# Patient Record
Sex: MALE | Race: WHITE | NOT HISPANIC OR LATINO | Employment: FULL TIME | URBAN - METROPOLITAN AREA
[De-identification: names, ages, dates, MRNs, and addresses within clinical notes are randomized per-mention and may not be internally consistent; named-entity substitution may affect disease eponyms.]

---

## 2017-04-03 DIAGNOSIS — I10 ESSENTIAL HYPERTENSION WITH GOAL BLOOD PRESSURE LESS THAN 140/90: ICD-10-CM

## 2017-04-03 NOTE — TELEPHONE ENCOUNTER
Losartan-Hydrochlorothiazide      Last Written Prescription Date: 11/29/16  Last Fill Quantity: 90, # refills: 1  Last Office Visit with G, P or [facility] prescribing provider: 04/18/16       Potassium   Date Value Ref Range Status   05/16/2016 3.4 3.4 - 5.3 mmol/L Final     Creatinine   Date Value Ref Range Status   05/16/2016 0.96 0.66 - 1.25 mg/dL Final     BP Readings from Last 3 Encounters:   05/16/16 124/64   05/02/16 148/78   04/18/16 160/86

## 2017-04-04 RX ORDER — LOSARTAN POTASSIUM AND HYDROCHLOROTHIAZIDE 25; 100 MG/1; MG/1
TABLET ORAL
Qty: 90 TABLET | Refills: 0 | Status: SHIPPED | OUTPATIENT
Start: 2017-04-04 | End: 2018-02-02

## 2017-04-04 NOTE — TELEPHONE ENCOUNTER
Medication is being filled for 1 time refill only due to:  Patient needs to be seen because annual.

## 2017-10-19 DIAGNOSIS — I10 ESSENTIAL HYPERTENSION WITH GOAL BLOOD PRESSURE LESS THAN 140/90: ICD-10-CM

## 2017-10-20 RX ORDER — LOSARTAN POTASSIUM AND HYDROCHLOROTHIAZIDE 25; 100 MG/1; MG/1
1 TABLET ORAL DAILY
Qty: 14 TABLET | Refills: 0 | Status: SHIPPED | OUTPATIENT
Start: 2017-10-20 | End: 2018-02-02

## 2017-10-20 NOTE — TELEPHONE ENCOUNTER
Routing refill request to provider for review/approval because:  Jen given x1 and patient did not follow up, please advise  Patient needs to be seen because it has been more than 1 year since last office visit.  Labs not current       Potassium   Date Value Ref Range Status   05/16/2016 3.4 3.4 - 5.3 mmol/L Final     Creatinine   Date Value Ref Range Status   05/16/2016 0.96 0.66 - 1.25 mg/dL Final     BP Readings from Last 3 Encounters:   05/16/16 124/64   05/02/16 148/78   04/18/16 160/86

## 2018-01-11 DIAGNOSIS — M10.09 IDIOPATHIC GOUT OF MULTIPLE SITES, UNSPECIFIED CHRONICITY: ICD-10-CM

## 2018-01-11 NOTE — TELEPHONE ENCOUNTER
"Requested Prescriptions   Pending Prescriptions Disp Refills     allopurinol (ZYLOPRIM) 100 MG tablet [Pharmacy Med Name: ALLOPURINOL 100 MG TABLET] 90 tablet 1    Last Written Prescription Date:  10-12-17  Last Fill Quantity: 90,  # refills: 1   Last Office Visit with [facility], [facility] or [facility] prescribing provider:  6-6-17   Future Office Visit:    Sig: TAKE 1 TABLET (100 MG) BY MOUTH DAILY    Gout Agents Protocol Failed    1/11/2018  1:32 AM       Failed - CBC on file in past 12 months    Recent Labs   Lab Test  04/11/16   1549   WBC  7.5   RBC  4.79   HGB  15.9   HCT  46.3   PLT  249            Failed - ALT on file in past 12 months    Recent Labs   Lab Test  05/16/16   1627   ALT  67            Failed - Uric acid greater than or equal to 6 on file in past 12 months    Recent Labs   Lab Test  04/11/16   1549   URIC  5.0     If level is 6mg/dL or greater, ok to refill one time and refer to provider.          Failed - Recent or future visit with authorizing provider's specialty    Patient had office visit in the last year or has a visit in the next 30 days with authorizing provider.  See \"Patient Info\" tab in inbasket, or \"Choose Columns\" in Meds & Orders section of the refill encounter.              Failed - Normal serum creatinine on file in the past 12 months    Recent Labs   Lab Test  05/16/16   1627   CR  0.96            Passed - Patient is age 18 or older        "

## 2018-01-23 ENCOUNTER — TELEPHONE (OUTPATIENT)
Dept: FAMILY MEDICINE | Facility: CLINIC | Age: 58
End: 2018-01-23

## 2018-01-23 DIAGNOSIS — M10.09 IDIOPATHIC GOUT OF MULTIPLE SITES, UNSPECIFIED CHRONICITY: ICD-10-CM

## 2018-01-23 NOTE — TELEPHONE ENCOUNTER
Last provider to see patient was back in 2016.  Provider was Jayla Trevino.  Will send to her.  Patient needs to be seen.  RN unable to deny.

## 2018-01-24 RX ORDER — ALLOPURINOL 100 MG/1
TABLET ORAL
Qty: 90 TABLET | Refills: 1 | OUTPATIENT
Start: 2018-01-24

## 2018-01-24 NOTE — TELEPHONE ENCOUNTER
Patient scheduled appointment with Jayla on 02/02/18, no gout present at this time. He has been out for a couple of days.

## 2018-01-25 RX ORDER — ALLOPURINOL 100 MG/1
100 TABLET ORAL DAILY
Qty: 30 TABLET | Refills: 0 | Status: SHIPPED | OUTPATIENT
Start: 2018-01-25 | End: 2018-02-02

## 2018-02-01 NOTE — PROGRESS NOTES
SUBJECTIVE:   Darnell Wick is a 57 year old male who presents to clinic today for the following health issues:      Hypertension Follow-up      Outpatient blood pressures are not being checked.    Low Salt Diet: no added salt      Amount of exercise or physical activity: 4-5 days/week for an average of 30-45 minutes    Problems taking medications regularly: No    Medication side effects: none    Diet: regular (no restrictions)      Follow up for gout. Patient states he is currently out of medication. He is unsure if he should continue with medication daily. Not having a current flare up.        Problem list and histories reviewed & adjusted, as indicated.  Additional history: as documented    Patient Active Problem List   Diagnosis     Idiopathic gout of multiple sites, unspecified chronicity     Hypertension, goal below 140/90     Past Surgical History:   Procedure Laterality Date     ORTHOPEDIC SURGERY      left middle finger       Social History   Substance Use Topics     Smoking status: Never Smoker     Smokeless tobacco: Never Used     Alcohol use Yes      Comment: Occasional, less than 1 drink per month     History reviewed. No pertinent family history.        Reviewed and updated as needed this visit by clinical staff  Tobacco  Allergies  Meds  Med Hx  Surg Hx  Fam Hx  Soc Hx      Reviewed and updated as needed this visit by Provider         ROS:  Constitutional, cardiovascular, MSK systems are negative, except as otherwise noted.    OBJECTIVE:                                                    BP (!) 197/95  Pulse 71  Wt 212 lb (96.2 kg)  SpO2 98%  BMI 33.71 kg/m2  Body mass index is 33.71 kg/(m^2).  GENERAL APPEARANCE: healthy, alert and no distress  RESP: lungs clear to auscultation - no rales, rhonchi or wheezes  CV: regular rates and rhythm, normal S1 S2, no S3 or S4, no murmur, click or rub and no irregular beats  PSYCH: mentation appears normal and affect normal/bright       ASSESSMENT:                                                       1. Hypertension, goal below 140/90    2. Idiopathic gout of multiple sites, unspecified chronicity    3. Need for hepatitis C screening test    4. Colon cancer screening    5. Lipid screening         PLAN:                                                    Will restart his anti hypertensive. Follow up blood pressure check with ancillary and repeat labs at that time. Allopurinol renewed, no changes.    Screenings discussed.    The patient was in agreement with the plan today and had no questions or concerns prior to leaving the clinic.     Jayla Trevino PA-C  [facility]

## 2018-02-02 ENCOUNTER — OFFICE VISIT (OUTPATIENT)
Dept: FAMILY MEDICINE | Facility: CLINIC | Age: 58
End: 2018-02-02
Payer: COMMERCIAL

## 2018-02-02 VITALS
WEIGHT: 212 LBS | SYSTOLIC BLOOD PRESSURE: 186 MMHG | HEART RATE: 71 BPM | OXYGEN SATURATION: 98 % | DIASTOLIC BLOOD PRESSURE: 84 MMHG | BODY MASS INDEX: 33.71 KG/M2

## 2018-02-02 DIAGNOSIS — I10 HYPERTENSION, GOAL BELOW 140/90: Primary | ICD-10-CM

## 2018-02-02 DIAGNOSIS — Z13.220 LIPID SCREENING: ICD-10-CM

## 2018-02-02 DIAGNOSIS — M10.09 IDIOPATHIC GOUT OF MULTIPLE SITES, UNSPECIFIED CHRONICITY: ICD-10-CM

## 2018-02-02 DIAGNOSIS — Z11.59 NEED FOR HEPATITIS C SCREENING TEST: ICD-10-CM

## 2018-02-02 DIAGNOSIS — Z12.11 COLON CANCER SCREENING: ICD-10-CM

## 2018-02-02 PROCEDURE — 99214 OFFICE O/P EST MOD 30 MIN: CPT | Performed by: PHYSICIAN ASSISTANT

## 2018-02-02 RX ORDER — ALLOPURINOL 100 MG/1
100 TABLET ORAL DAILY
Qty: 90 TABLET | Refills: 3 | Status: SHIPPED | OUTPATIENT
Start: 2018-02-02 | End: 2019-02-04

## 2018-02-02 RX ORDER — LOSARTAN POTASSIUM AND HYDROCHLOROTHIAZIDE 25; 100 MG/1; MG/1
1 TABLET ORAL DAILY
Qty: 90 TABLET | Refills: 0 | Status: SHIPPED | OUTPATIENT
Start: 2018-02-02 | End: 2018-05-08

## 2018-02-02 NOTE — MR AVS SNAPSHOT
"              After Visit Summary   2/2/2018    Darnell Wick    MRN: [id number]           Patient Information     Date Of Birth          1960        Visit Information        Provider Department      2/2/2018 3:40 PM Jayla Trevino PA-C [facility]        Today's Diagnoses     Hypertension, goal below 140/90    -  1    Idiopathic gout of multiple sites, unspecified chronicity        Need for hepatitis C screening test        Colon cancer screening        Lipid screening           Follow-ups after your visit        Future tests that were ordered for you today     Open Future Orders        Priority Expected Expires Ordered    Lipid panel reflex to direct LDL Non-fasting Routine  4/2/2018 2/2/2018    Hepatitis C Screen Reflex to HCV RNA Quant and Genotype Routine  4/2/2018 2/2/2018    Comprehensive metabolic panel Routine  4/2/2018 2/2/2018    Fecal colorectal cancer screen (FIT) Routine 2/23/2018 4/27/2018 2/2/2018            Who to contact     Normal or non-critical lab and imaging results will be communicated to you by LonoCloudhart, letter or phone within 4 business days after the clinic has received the results. If you do not hear from us within 7 days, please contact the clinic through LonoCloudhart or phone. If you have a critical or abnormal lab result, we will notify you by phone as soon as possible.  Submit refill requests through [employer] or call your pharmacy and they will forward the refill request to us. Please allow 3 business days for your refill to be completed.          If you need to speak with a  for additional information , please call: [phone]             Additional Information About Your Visit        Keukey Information     Keukey lets you send messages to your doctor, view your test results, renew your prescriptions, schedule appointments and more. To sign up, go to [email or web address] . Click on \"Log in\" on the left side of the screen, which will take " "you to the Welcome page. Then click on \"Sign up Now\" on the right side of the page.     You will be asked to enter the access code listed below, as well as some personal information. Please follow the directions to create your username and password.     Your access code is: [email or web address]  Expires: 5/3/2018  4:08 PM     Your access code will  in 90 days. If you need help or a new code, please call your [place] clinic or [phone].        Care EveryWhere ID     This is your Care EveryWhere ID. This could be used by other organizations to access your [place] medical records  [id number]        Your Vitals Were     Pulse Pulse Oximetry BMI (Body Mass Index)             71 98% 33.71 kg/m2          Blood Pressure from Last 3 Encounters:   18 (!) 197/95   16 124/64   16 148/78    Weight from Last 3 Encounters:   18 212 lb (96.2 kg)   16 224 lb 3.2 oz (101.7 kg)   16 222 lb (100.7 kg)                 Today's Medication Changes          These changes are accurate as of 18  4:08 PM.  If you have any questions, ask your nurse or doctor.               These medicines have changed or have updated prescriptions.        Dose/Directions    losartan-hydrochlorothiazide 100-25 MG per tablet   Commonly known as:  HYZAAR   This may have changed:    - additional instructions  - Another medication with the same name was removed. Continue taking this medication, and follow the directions you see here.   Used for:  Hypertension, goal below 140/90   Changed by:  Jayla Trevino PA-C        Dose:  1 tablet   Take 1 tablet by mouth daily - take a half tab daily for the first 1 week   Quantity:  90 tablet   Refills:  0            Where to get your medicines      These medications were sent to [facility]/pharmacy #[id number] - [place] - [address]  [address] [place] [address]     Phone:  [phone]     allopurinol 100 MG tablet    " losartan-hydrochlorothiazide 100-25 MG per tablet                Primary Care Provider Office Phone # Fax #    Jayla Trevino PA-C [phone]       [address] [facility] [address] [place] [address]        Equal Access to Services     [facility]: Hadii aad ku hadasho Soomaali, waaxda luqadaha, qaybta kaalmada adeegyada, waxay idiin hayaan adeeg kharash la'aan ah. So [facility] [phone].    ATENCIÓN: Si habla español, tiene a ponce disposición servicios gratuitos de asistencia lingüística. [facility] [phone].    We comply with applicable federal civil rights laws and [place] laws. We do not discriminate on the basis of race, color, national origin, age, disability, sex, sexual orientation, or gender identity.            Thank you!     Thank you for choosing [facility]  for your care. Our goal is always to provide you with excellent care. Hearing back from our patients is one way we can continue to improve our services. Please take a few minutes to complete the written survey that you may receive in the mail after your visit with us. Thank you!             Your Updated Medication List - Protect others around you: Learn how to safely use, store and throw away your medicines at www.disposemymeds.org.          This list is accurate as of 2/2/18  4:08 PM.  Always use your most recent med list.                   Brand Name Dispense Instructions for use Diagnosis    allopurinol 100 MG tablet    ZYLOPRIM    90 tablet    Take 1 tablet (100 mg) by mouth daily    Idiopathic gout of multiple sites, unspecified chronicity       losartan-hydrochlorothiazide 100-25 MG per tablet    HYZAAR    90 tablet    Take 1 tablet by mouth daily - take a half tab daily for the first 1 week    Hypertension, goal below 140/90

## 2018-02-09 DIAGNOSIS — Z12.11 COLON CANCER SCREENING: ICD-10-CM

## 2018-02-09 LAB — HEMOCCULT STL QL IA: NEGATIVE

## 2018-02-09 PROCEDURE — 82274 ASSAY TEST FOR BLOOD FECAL: CPT | Performed by: PHYSICIAN ASSISTANT

## 2018-02-09 NOTE — LETTER
February 12, 2018      Darnell Wick  [address] [place] [id number]        Dear ,    We are writing to inform you of your test results.    Your colon cancer screening is negative.    Resulted Orders   Fecal colorectal cancer screen (FIT)   Result Value Ref Range    Occult Blood Scn FIT Negative NEG^Negative       If you have any questions or concerns, please call the clinic at the number listed above.       Sincerely,        Jayla Trevino PA-C/farrah

## 2018-02-12 NOTE — PROGRESS NOTES
Please send the following letter to the patient:    Ray,    Your colon cancer screening is negative.    Please call me with any questions or concerns.          Jayla Trevino PA-C

## 2018-02-23 ENCOUNTER — ALLIED HEALTH/NURSE VISIT (OUTPATIENT)
Dept: NURSING | Facility: CLINIC | Age: 58
End: 2018-02-23
Payer: COMMERCIAL

## 2018-02-23 VITALS — HEART RATE: 72 BPM | DIASTOLIC BLOOD PRESSURE: 88 MMHG | SYSTOLIC BLOOD PRESSURE: 150 MMHG

## 2018-02-23 DIAGNOSIS — Z13.220 LIPID SCREENING: ICD-10-CM

## 2018-02-23 DIAGNOSIS — Z11.59 NEED FOR HEPATITIS C SCREENING TEST: ICD-10-CM

## 2018-02-23 DIAGNOSIS — I10 HYPERTENSION, GOAL BELOW 140/90: Primary | ICD-10-CM

## 2018-02-23 DIAGNOSIS — I10 HYPERTENSION, GOAL BELOW 140/90: ICD-10-CM

## 2018-02-23 LAB
ALBUMIN SERPL-MCNC: 4.1 G/DL (ref 3.4–5)
ALP SERPL-CCNC: 81 U/L (ref 40–150)
ALT SERPL W P-5'-P-CCNC: 33 U/L (ref 0–70)
ANION GAP SERPL CALCULATED.3IONS-SCNC: 9 MMOL/L (ref 3–14)
AST SERPL W P-5'-P-CCNC: 30 U/L (ref 0–45)
BILIRUB SERPL-MCNC: 1.5 MG/DL (ref 0.2–1.3)
BUN SERPL-MCNC: 18 MG/DL (ref 7–30)
CALCIUM SERPL-MCNC: 9.4 MG/DL (ref 8.5–10.1)
CHLORIDE SERPL-SCNC: 98 MMOL/L (ref 94–109)
CHOLEST SERPL-MCNC: 253 MG/DL
CO2 SERPL-SCNC: 26 MMOL/L (ref 20–32)
CREAT SERPL-MCNC: 0.93 MG/DL (ref 0.66–1.25)
CREAT UR-MCNC: 51 MG/DL
GFR SERPL CREATININE-BSD FRML MDRD: 84 ML/MIN/1.7M2
GLUCOSE SERPL-MCNC: 95 MG/DL (ref 70–99)
HDLC SERPL-MCNC: 69 MG/DL
LDLC SERPL CALC-MCNC: 162 MG/DL
MICROALBUMIN UR-MCNC: <5 MG/L
MICROALBUMIN/CREAT UR: NORMAL MG/G CR (ref 0–17)
NONHDLC SERPL-MCNC: 184 MG/DL
POTASSIUM SERPL-SCNC: 4 MMOL/L (ref 3.4–5.3)
PROT SERPL-MCNC: 8 G/DL (ref 6.8–8.8)
SODIUM SERPL-SCNC: 133 MMOL/L (ref 133–144)
TRIGL SERPL-MCNC: 112 MG/DL

## 2018-02-23 PROCEDURE — 80061 LIPID PANEL: CPT | Performed by: PHYSICIAN ASSISTANT

## 2018-02-23 PROCEDURE — 86803 HEPATITIS C AB TEST: CPT | Performed by: PHYSICIAN ASSISTANT

## 2018-02-23 PROCEDURE — 82043 UR ALBUMIN QUANTITATIVE: CPT | Performed by: PHYSICIAN ASSISTANT

## 2018-02-23 PROCEDURE — 36415 COLL VENOUS BLD VENIPUNCTURE: CPT | Performed by: PHYSICIAN ASSISTANT

## 2018-02-23 PROCEDURE — 80053 COMPREHEN METABOLIC PANEL: CPT | Performed by: PHYSICIAN ASSISTANT

## 2018-02-23 PROCEDURE — 99207 ZZC NO CHARGE NURSE ONLY: CPT

## 2018-02-23 RX ORDER — AMLODIPINE BESYLATE 5 MG/1
5 TABLET ORAL DAILY
Qty: 30 TABLET | Refills: 1 | Status: SHIPPED | OUTPATIENT
Start: 2018-02-23 | End: 2018-03-09

## 2018-02-23 NOTE — MR AVS SNAPSHOT
"              After Visit Summary   2/23/2018    Darnell Wick    MRN: [id number]           Patient Information     Date Of Birth          1960        Visit Information        Provider Department      2/23/2018 3:45 PM BE ANCILLARY [facility] [place]        Today's Diagnoses     Hypertension, goal below 140/90    -  1       Follow-ups after your visit        Your next 10 appointments already scheduled     Mar 09, 2018  3:45 PM CST   Nurse Only with BE ANCILLARY   [facility] [place] ([facility] [place])    [address] [facility]  [place] [address] [phone]              Who to contact     If you have questions or need follow up information about today's clinic visit or your schedule please contact [facility] directly at [phone].  Normal or non-critical lab and imaging results will be communicated to you by MyChart, letter or phone within 4 business days after the clinic has received the results. If you do not hear from us within 7 days, please contact the clinic through MyChart or phone. If you have a critical or abnormal lab result, we will notify you by phone as soon as possible.  Submit refill requests through [employer] or call your pharmacy and they will forward the refill request to us. Please allow 3 business days for your refill to be completed.          Additional Information About Your Visit        [employer] Information     [employer] lets you send messages to your doctor, view your test results, renew your prescriptions, schedule appointments and more. To sign up, go to [email or web address] . Click on \"Log in\" on the left side of the screen, which will take you to the Welcome page. Then click on \"Sign up Now\" on the right side of the page.     You will be asked to enter the access code listed below, as well as some personal information. Please follow the directions to create your username and password.     Your access code is: [email or web address]  Expires: 5/3/2018  " 4:08 PM     Your access code will  in 90 days. If you need help or a new code, please call your [place] clinic or [phone].        Care EveryWhere ID     This is your Care EveryWhere ID. This could be used by other organizations to access your [place] medical records  [id number]        Your Vitals Were     Pulse                   72            Blood Pressure from Last 3 Encounters:   18 150/88   18 186/84   16 124/64    Weight from Last 3 Encounters:   18 212 lb (96.2 kg)   16 224 lb 3.2 oz (101.7 kg)   16 222 lb (100.7 kg)              Today, you had the following     No orders found for display         Today's Medication Changes          These changes are accurate as of 18  3:59 PM.  If you have any questions, ask your nurse or doctor.               Start taking these medicines.        Dose/Directions    amLODIPine 5 MG tablet   Commonly known as:  NORVASC   Used for:  Hypertension, goal below 140/90        Dose:  5 mg   Take 1 tablet (5 mg) by mouth daily   Quantity:  30 tablet   Refills:  1            Where to get your medicines      These medications were sent to [facility]/pharmacy #[id number] - [place], [place] - [address]  [address] , [facility] [place] [address]     Phone:  [phone]     amLODIPine 5 MG tablet                Primary Care Provider Office Phone # Fax #    Jayla Trevino PA-C [phone]       [address] [place] [address]        Equal Access to Services     [facility] : Hadii aad ku hadasho Soomaali, waaxda luqadaha, qaybta kaalmada johana, cooper loyola . So [facility] [phone].    ATENCIÓN: Si habla español, tiene a ponce disposición servicios gratuitos de asistencia lingüística. [place] al [phone].    We comply with applicable federal civil rights laws and [place] laws. We do not discriminate on the basis of race, color, national origin, age, disability, sex,  sexual orientation, or gender identity.            Thank you!     Thank you for choosing [facility]  for your care. Our goal is always to provide you with excellent care. Hearing back from our patients is one way we can continue to improve our services. Please take a few minutes to complete the written survey that you may receive in the mail after your visit with us. Thank you!             Your Updated Medication List - Protect others around you: Learn how to safely use, store and throw away your medicines at www.disposemymeds.org.          This list is accurate as of 2/23/18  3:59 PM.  Always use your most recent med list.                   Brand Name Dispense Instructions for use Diagnosis    allopurinol 100 MG tablet    ZYLOPRIM    90 tablet    Take 1 tablet (100 mg) by mouth daily    Idiopathic gout of multiple sites, unspecified chronicity       amLODIPine 5 MG tablet    NORVASC    30 tablet    Take 1 tablet (5 mg) by mouth daily    Hypertension, goal below 140/90       losartan-hydrochlorothiazide 100-25 MG per tablet    HYZAAR    90 tablet    Take 1 tablet by mouth daily - take a half tab daily for the first 1 week    Hypertension, goal below 140/90

## 2018-02-23 NOTE — PROGRESS NOTES
Darnell Wick is a 57 year old male who comes in today for a Blood Pressure check because of ongoing blood pressure monitoring.    *Document pulse and BP  *Use new set of vitals button for multiple readings.  *Use extended vitals for orthostatic    Vitals as recorded, a large cuff was used.    Patient is taking medication as prescribed  Patient is tolerating medications well.  Patient is not monitoring Blood Pressure at home.      Current complaints: none    Disposition: results routed to MD/MONY

## 2018-02-24 LAB — HCV AB SERPL QL IA: NONREACTIVE

## 2018-02-26 ENCOUNTER — TELEPHONE (OUTPATIENT)
Dept: FAMILY MEDICINE | Facility: CLINIC | Age: 58
End: 2018-02-26

## 2018-02-26 DIAGNOSIS — E78.5 HYPERLIPIDEMIA LDL GOAL <130: Primary | ICD-10-CM

## 2018-02-26 NOTE — LETTER
[facility]  [address] [facility] [address]  [phone]        October 4, 2018    Darnell Wick  [address] [place] [id number]              Dear Darnell Wick    This is to remind you that your Lipid profile is due.    You may call our office at [phone] to schedule an appointment.    Please disregard this notice if you have already had your labs drawn or made an appointment.        Sincerely,        Jayla Trevino PA-C

## 2018-02-26 NOTE — LETTER
Dr. Duyen Gale, pathologist in room March 1, 2018      Darnell Wick  [address] [place] [id number]        Dear ,    We are writing to inform you of your test results.    Kidney function is normal. Your blood sugar is normal - no signs of diabetes. Hep C screen is negative.  LDL cholesterol is high with your goal being <130. I'd like to recheck this in 6 months as high cholesterol can increase risk of plaque build up, heart attacks and strokes    Resulted Orders   Lipid panel reflex to direct LDL Non-fasting   Result Value Ref Range    Cholesterol 253 (H) <200 mg/dL      Comment:      Desirable:       <200 mg/dl    Triglycerides 112 <150 mg/dL    HDL Cholesterol 69 >39 mg/dL    LDL Cholesterol Calculated 162 (H) <100 mg/dL      Comment:      Above desirable:  100-129 mg/dl  Borderline High:  130-159 mg/dL  High:             160-189 mg/dL  Very high:       >189 mg/dl      Non HDL Cholesterol 184 (H) <130 mg/dL      Comment:      Above Desirable:  130-159 mg/dl  Borderline high:  160-189 mg/dl  High:             190-219 mg/dl  Very high:       >219 mg/dl     Hepatitis C Screen Reflex to HCV RNA Quant and Genotype   Result Value Ref Range    Hepatitis C Antibody Nonreactive NR^Nonreactive      Comment:      Assay performance characteristics have not been established for newborns,   infants, and children     Comprehensive metabolic panel   Result Value Ref Range    Sodium 133 133 - 144 mmol/L    Potassium 4.0 3.4 - 5.3 mmol/L    Chloride 98 94 - 109 mmol/L    Carbon Dioxide 26 20 - 32 mmol/L    Anion Gap 9 3 - 14 mmol/L    Glucose 95 70 - 99 mg/dL    Urea Nitrogen 18 7 - 30 mg/dL    Creatinine 0.93 0.66 - 1.25 mg/dL    GFR Estimate 84 >60 mL/min/1.7m2      Comment:      Non  GFR Calc    GFR Estimate If Black >90 >60 mL/min/1.7m2      Comment:       GFR Calc    Calcium 9.4 8.5 - 10.1 mg/dL    Bilirubin Total 1.5 (H) 0.2 - 1.3 mg/dL    Albumin 4.1 3.4 - 5.0 g/dL    Protein Total 8.0 6.8 - 8.8 g/dL     Alkaline Phosphatase 81 40 - 150 U/L    ALT 33 0 - 70 U/L    AST 30 0 - 45 U/L   Albumin Random Urine Quantitative with Creat Ratio   Result Value Ref Range    Creatinine Urine 51 mg/dL    Albumin Urine mg/L <5 mg/L    Albumin Urine mg/g Cr Unable to calculate due to low value 0 - 17 mg/g Cr       If you have any questions or concerns, please call the clinic at the number listed above.       Sincerely,        Jayla Trevino PA-C/rogerso

## 2018-02-26 NOTE — TELEPHONE ENCOUNTER
Please call patient with the following info:    Kidney function is normal. Your blood sugar is normal - no signs of diabetes. Hep C screen is negative.  LDL cholesterol is high with his goal being <130. I'd like to recheck this in 6 months as high cholesterol can increase risk of plaque build up, heart attacks and strokes

## 2018-02-26 NOTE — LETTER
[facility]  [address] [facility] [address]  [phone]        December 18, 2018    Darnell Wick  [address] [place] [id number]              Dear Darnell Wick    This is to remind you that your Lipid profile is due.    You may call our office at [phone] to schedule an appointment.    Please disregard this notice if you have already had your labs drawn or made an appointment.        Sincerely,        Jayla Trevino PA-C

## 2018-03-01 NOTE — TELEPHONE ENCOUNTER
Patient returned call, informed him of results and recommendations. Patient understood. No further questions. Letter and result mailed to pt. DAVE Briseno

## 2018-03-01 NOTE — TELEPHONE ENCOUNTER
Patient returned call, voicemail left stating he does not get home until after 415 pm. Request to be called then

## 2018-03-09 ENCOUNTER — ALLIED HEALTH/NURSE VISIT (OUTPATIENT)
Dept: NURSING | Facility: CLINIC | Age: 58
End: 2018-03-09
Payer: COMMERCIAL

## 2018-03-09 VITALS — DIASTOLIC BLOOD PRESSURE: 80 MMHG | HEART RATE: 72 BPM | SYSTOLIC BLOOD PRESSURE: 140 MMHG

## 2018-03-09 DIAGNOSIS — I10 HYPERTENSION, GOAL BELOW 140/90: ICD-10-CM

## 2018-03-09 PROCEDURE — 99207 ZZC NO CHARGE NURSE ONLY: CPT

## 2018-03-09 RX ORDER — AMLODIPINE BESYLATE 10 MG/1
10 TABLET ORAL DAILY
Qty: 30 TABLET | Refills: 1 | Status: SHIPPED | OUTPATIENT
Start: 2018-03-09 | End: 2018-04-02

## 2018-03-09 NOTE — MR AVS SNAPSHOT
"              After Visit Summary   3/9/2018    Darnell Wick    MRN: [id number]           Patient Information     Date Of Birth          1960        Visit Information        Provider Department      3/9/2018 3:45 PM BE ANCILLARY [facility]        Today's Diagnoses     Hypertension, goal below 140/90           Follow-ups after your visit        Who to contact     If you have questions or need follow up information about today's clinic visit or your schedule please contact [facility] directly at [phone].  Normal or non-critical lab and imaging results will be communicated to you by MyChart, letter or phone within 4 business days after the clinic has received the results. If you do not hear from us within 7 days, please contact the clinic through TalkTohart or phone. If you have a critical or abnormal lab result, we will notify you by phone as soon as possible.  Submit refill requests through [employer] or call your pharmacy and they will forward the refill request to us. Please allow 3 business days for your refill to be completed.          Additional Information About Your Visit        MyChart Information     [employer] lets you send messages to your doctor, view your test results, renew your prescriptions, schedule appointments and more. To sign up, go to [email or web address] . Click on \"Log in\" on the left side of the screen, which will take you to the Welcome page. Then click on \"Sign up Now\" on the right side of the page.     You will be asked to enter the access code listed below, as well as some personal information. Please follow the directions to create your username and password.     Your access code is: [email or web address]  Expires: 5/3/2018  4:08 PM     Your access code will  in 90 days. If you need help or a new code, please call your [place] clinic or [phone].        Care EveryWhere ID     This is your Care EveryWhere ID. This could be used by other organizations to " access your [place] medical records  [id number]        Your Vitals Were     Pulse                   72            Blood Pressure from Last 3 Encounters:   03/09/18 140/80   02/23/18 150/88   02/02/18 186/84    Weight from Last 3 Encounters:   02/02/18 212 lb (96.2 kg)   04/18/16 224 lb 3.2 oz (101.7 kg)   04/11/16 222 lb (100.7 kg)              Today, you had the following     No orders found for display         Today's Medication Changes          These changes are accurate as of 3/9/18  3:59 PM.  If you have any questions, ask your nurse or doctor.               These medicines have changed or have updated prescriptions.        Dose/Directions    amLODIPine 10 MG tablet   Commonly known as:  NORVASC   This may have changed:    - medication strength  - how much to take   Used for:  Hypertension, goal below 140/90        Dose:  10 mg   Take 1 tablet (10 mg) by mouth daily   Quantity:  30 tablet   Refills:  1            Where to get your medicines      These medications were sent to [facility]/pharmacy [facility][id number] [place] [place] [address] [address]  [address] [address] , [facility] [address]     Phone:  [phone]     amLODIPine 10 MG tablet                Primary Care Provider Office Phone # Fax #    Jayla Trevino PA-C [phone] [address] [place] [address]        Equal Access to Services     [facility]: Hadii aad ku hadasho Soomaali, waaxda luqadaha, qaybta kaalmada adeegyada, cooper loyola . So [facility] [phone].    ATENCIÓN: Si habla español, tiene a ponce disposición servicios gratuitos de asistencia lingüística. Llame al [phone].    We comply with applicable federal civil rights laws and [place] laws. We do not discriminate on the basis of race, color, national origin, age, disability, sex, sexual orientation, or gender identity.            Thank you!     Thank you for choosing [facility]  for your care. Our goal is  always to provide you with excellent care. Hearing back from our patients is one way we can continue to improve our services. Please take a few minutes to complete the written survey that you may receive in the mail after your visit with us. Thank you!             Your Updated Medication List - Protect others around you: Learn how to safely use, store and throw away your medicines at www.disposemymeds.org.          This list is accurate as of 3/9/18  3:59 PM.  Always use your most recent med list.                   Brand Name Dispense Instructions for use Diagnosis    allopurinol 100 MG tablet    ZYLOPRIM    90 tablet    Take 1 tablet (100 mg) by mouth daily    Idiopathic gout of multiple sites, unspecified chronicity       amLODIPine 10 MG tablet    NORVASC    30 tablet    Take 1 tablet (10 mg) by mouth daily    Hypertension, goal below 140/90       losartan-hydrochlorothiazide 100-25 MG per tablet    HYZAAR    90 tablet    Take 1 tablet by mouth daily - take a half tab daily for the first 1 week    Hypertension, goal below 140/90

## 2018-03-09 NOTE — PROGRESS NOTES
Darnell Wick is a 57 year old male who comes in today for a Blood Pressure check because of new medication.    *Document pulse and BP  *Use new set of vitals button for multiple readings.  *Use extended vitals for orthostatic    Vitals as recorded, a large cuff was used.    Patient is taking medication as prescribed  Patient is tolerating medications well.  Patient is not monitoring Blood Pressure at home.      Current complaints: none    Disposition: MD/AP notified while patient in the clinic  Nancy Pena CMA

## 2018-03-30 ENCOUNTER — ALLIED HEALTH/NURSE VISIT (OUTPATIENT)
Dept: NURSING | Facility: CLINIC | Age: 58
End: 2018-03-30
Payer: COMMERCIAL

## 2018-03-30 VITALS
SYSTOLIC BLOOD PRESSURE: 139 MMHG | WEIGHT: 210 LBS | BODY MASS INDEX: 33.39 KG/M2 | RESPIRATION RATE: 16 BRPM | HEART RATE: 73 BPM | OXYGEN SATURATION: 99 % | DIASTOLIC BLOOD PRESSURE: 78 MMHG

## 2018-03-30 DIAGNOSIS — I10 HYPERTENSION, GOAL BELOW 140/90: Primary | ICD-10-CM

## 2018-03-30 PROCEDURE — 99207 ZZC NO CHARGE NURSE ONLY: CPT

## 2018-03-30 NOTE — NURSING NOTE
Darnell Wick is a 58 year old male who comes in today for a Blood Pressure check because of new medication dose increase and ongoing blood pressure monitoring.        Vitals as recorded, a large cuff was used.    Patient is taking medication as prescribed  Patient is tolerating medications well.  Patient is not monitoring Blood Pressure at home.      Current complaints: none    Disposition: results routed to MD/PA

## 2018-03-30 NOTE — MR AVS SNAPSHOT
"              After Visit Summary   3/30/2018    Darnell Wick    MRN: [id number]           Patient Information     Date Of Birth          1960        Visit Information        Provider Department      3/30/2018 3:45 PM BE ANCILLARY [facility]        Today's Diagnoses     Hypertension, goal below 140/90    -  1       Follow-ups after your visit        Who to contact     If you have questions or need follow up information about today's clinic visit or your schedule please contact [facility] directly at [phone].  Normal or non-critical lab and imaging results will be communicated to you by MyChart, letter or phone within 4 business days after the clinic has received the results. If you do not hear from us within 7 days, please contact the clinic through Zoe Majest[employer]art or phone. If you have a critical or abnormal lab result, we will notify you by phone as soon as possible.  Submit refill requests through [employer] or call your pharmacy and they will forward the refill request to us. Please allow 3 business days for your refill to be completed.          Additional Information About Your Visit        MyChart Information     [employer] lets you send messages to your doctor, view your test results, renew your prescriptions, schedule appointments and more. To sign up, go to [email or web address] . Click on \"Log in\" on the left side of the screen, which will take you to the Welcome page. Then click on \"Sign up Now\" on the right side of the page.     You will be asked to enter the access code listed below, as well as some personal information. Please follow the directions to create your username and password.     Your access code is: [email or web address]  Expires: 5/3/2018  5:08 PM     Your access code will  in 90 days. If you need help or a new code, please call your [facility] or [phone].        Care EveryWhere ID     This is your Care EveryWhere ID. This could be used by other " organizations to access your [place] medical records  [id number]        Your Vitals Were     Pulse Respirations Pulse Oximetry BMI (Body Mass Index)          73 16 99% 33.39 kg/m2         Blood Pressure from Last 3 Encounters:   03/30/18 139/78   03/09/18 140/80   02/23/18 150/88    Weight from Last 3 Encounters:   03/30/18 210 lb (95.3 kg)   02/02/18 212 lb (96.2 kg)   04/18/16 224 lb 3.2 oz (101.7 kg)              Today, you had the following     No orders found for display       Primary Care Provider Office Phone # Fax #    Jayla Trevino PA-C [phone]       [address] [place] [address]        Equal Access to Services     [facility]: Hadii aad ku hadasho Soomaali, waaxda luqadaha, qaybta kaalmada adeegyada, waxay allyssain hayalberto loyola . So [facility] [phone].    ATENCIÓN: Si habla español, tiene a ponce disposición servicios gratuitos de asistencia lingüística. Cate al [phone].    We comply with applicable federal civil rights laws and [place] laws. We do not discriminate on the basis of race, color, national origin, age, disability, sex, sexual orientation, or gender identity.            Thank you!     Thank you for choosing [facility]  for your care. Our goal is always to provide you with excellent care. Hearing back from our patients is one way we can continue to improve our services. Please take a few minutes to complete the written survey that you may receive in the mail after your visit with us. Thank you!             Your Updated Medication List - Protect others around you: Learn how to safely use, store and throw away your medicines at www.disposemymeds.org.          This list is accurate as of 3/30/18  4:06 PM.  Always use your most recent med list.                   Brand Name Dispense Instructions for use Diagnosis    allopurinol 100 MG tablet    ZYLOPRIM    90 tablet    Take 1 tablet (100 mg) by mouth daily    Idiopathic gout of  multiple sites, unspecified chronicity       amLODIPine 10 MG tablet    NORVASC    30 tablet    Take 1 tablet (10 mg) by mouth daily    Hypertension, goal below 140/90       losartan-hydrochlorothiazide 100-25 MG per tablet    HYZAAR    90 tablet    Take 1 tablet by mouth daily - take a half tab daily for the first 1 week    Hypertension, goal below 140/90

## 2018-04-02 RX ORDER — AMLODIPINE BESYLATE 10 MG/1
10 TABLET ORAL DAILY
Qty: 90 TABLET | Refills: 3 | Status: SHIPPED | OUTPATIENT
Start: 2018-04-02 | End: 2019-02-15

## 2018-05-08 DIAGNOSIS — I10 HYPERTENSION, GOAL BELOW 140/90: ICD-10-CM

## 2018-05-08 NOTE — TELEPHONE ENCOUNTER
"Requested Prescriptions   Pending Prescriptions Disp Refills     losartan-hydrochlorothiazide (HYZAAR) 100-25 MG per tablet [Pharmacy Med Name: LOSARTAN-HCTZ 100-25 MG TAB] 90 tablet 0    Last Written Prescription Date:  2/2/18  Last Fill Quantity: 90,  # refills: 0   Last office visit: 2/2/2018 with prescribing provider:  2/2/18 Knaeble   Future Office Visit:   Sig: TAKE 1 TABLET BY MOUTH DAILY - TAKE A HALF TAB DAILY FOR THE FIRST 1 WEEK    Angiotensin-II Receptors Passed    5/8/2018  4:23 PM       Passed - Blood pressure under 140/90 in past 12 months    BP Readings from Last 3 Encounters:   03/30/18 139/78   03/09/18 140/80   02/23/18 150/88                Passed - Recent (12 mo) or future (30 days) visit within the authorizing provider's specialty    Patient had office visit in the last 12 months or has a visit in the next 30 days with authorizing provider or within the authorizing provider's specialty.  See \"Patient Info\" tab in inbasket, or \"Choose Columns\" in Meds & Orders section of the refill encounter.           Passed - Patient is age 18 or older       Passed - Normal serum creatinine on file in past 12 months    Recent Labs   Lab Test  02/23/18   1601   CR  0.93            Passed - Normal serum potassium on file in past 12 months    Recent Labs   Lab Test  02/23/18   1601   POTASSIUM  4.0                    "

## 2018-05-09 RX ORDER — LOSARTAN POTASSIUM AND HYDROCHLOROTHIAZIDE 25; 100 MG/1; MG/1
1 TABLET ORAL DAILY
Qty: 90 TABLET | Refills: 1 | Status: SHIPPED | OUTPATIENT
Start: 2018-05-09 | End: 2018-11-03

## 2018-05-09 NOTE — TELEPHONE ENCOUNTER
Routing refill request to provider for review/approval because:  Sig updated to 1 tab daily, previous sig stated-      Disp Refills Start End SARANYA     losartan-hydrochlorothiazide (HYZAAR) 100-25 MG per tablet 90 tablet 0 2/2/2018  No     Sig: Take 1 tablet by mouth daily - take a half tab daily for the first 1 week

## 2018-11-03 DIAGNOSIS — I10 HYPERTENSION, GOAL BELOW 140/90: ICD-10-CM

## 2018-11-05 RX ORDER — LOSARTAN POTASSIUM AND HYDROCHLOROTHIAZIDE 25; 100 MG/1; MG/1
TABLET ORAL
Qty: 90 TABLET | Refills: 1 | Status: SHIPPED | OUTPATIENT
Start: 2018-11-05 | End: 2019-02-15

## 2018-11-05 NOTE — TELEPHONE ENCOUNTER
"Requested Prescriptions   Pending Prescriptions Disp Refills     losartan-hydrochlorothiazide (HYZAAR) 100-25 MG per tablet [Pharmacy Med Name: LOSARTAN-HCTZ 100-25 MG TAB] 90 tablet 1    Last Written Prescription Date:  08/04/18  Last Fill Quantity: 90,  # refills: 1   Last office visit: 2/2/2018 with prescribing provider:  OTONIEL Trevino Future Office Visit:     Sig: TAKE 1 TABLET BY MOUTH EVERY DAY    Angiotensin-II Receptors Passed    11/3/2018  1:32 AM       Passed - Blood pressure under 140/90 in past 12 months    BP Readings from Last 3 Encounters:   03/30/18 139/78   03/09/18 140/80   02/23/18 150/88                Passed - Recent (12 mo) or future (30 days) visit within the authorizing provider's specialty    Patient had office visit in the last 12 months or has a visit in the next 30 days with authorizing provider or within the authorizing provider's specialty.  See \"Patient Info\" tab in inbasket, or \"Choose Columns\" in Meds & Orders section of the refill encounter.             Passed - Patient is age 18 or older       Passed - Normal serum creatinine on file in past 12 months    Recent Labs   Lab Test  02/23/18   1601   CR  0.93            Passed - Normal serum potassium on file in past 12 months    Recent Labs   Lab Test  02/23/18   1601   POTASSIUM  4.0                      "

## 2019-02-15 ENCOUNTER — OFFICE VISIT (OUTPATIENT)
Dept: FAMILY MEDICINE | Facility: CLINIC | Age: 59
End: 2019-02-15
Payer: COMMERCIAL

## 2019-02-15 VITALS — DIASTOLIC BLOOD PRESSURE: 68 MMHG | SYSTOLIC BLOOD PRESSURE: 132 MMHG

## 2019-02-15 DIAGNOSIS — Z12.11 COLON CANCER SCREENING: ICD-10-CM

## 2019-02-15 DIAGNOSIS — E78.5 HYPERLIPIDEMIA LDL GOAL <130: Primary | ICD-10-CM

## 2019-02-15 DIAGNOSIS — M10.09 IDIOPATHIC GOUT OF MULTIPLE SITES, UNSPECIFIED CHRONICITY: ICD-10-CM

## 2019-02-15 DIAGNOSIS — Z12.5 SCREENING PSA (PROSTATE SPECIFIC ANTIGEN): ICD-10-CM

## 2019-02-15 DIAGNOSIS — I10 HYPERTENSION, GOAL BELOW 140/90: ICD-10-CM

## 2019-02-15 LAB
ALBUMIN SERPL-MCNC: 4.2 G/DL (ref 3.4–5)
ALP SERPL-CCNC: 81 U/L (ref 40–150)
ALT SERPL W P-5'-P-CCNC: 46 U/L (ref 0–70)
ANION GAP SERPL CALCULATED.3IONS-SCNC: 7 MMOL/L (ref 3–14)
AST SERPL W P-5'-P-CCNC: 35 U/L (ref 0–45)
BILIRUB SERPL-MCNC: 1.2 MG/DL (ref 0.2–1.3)
BUN SERPL-MCNC: 15 MG/DL (ref 7–30)
CALCIUM SERPL-MCNC: 9.4 MG/DL (ref 8.5–10.1)
CHLORIDE SERPL-SCNC: 98 MMOL/L (ref 94–109)
CHOLEST SERPL-MCNC: 206 MG/DL
CO2 SERPL-SCNC: 27 MMOL/L (ref 20–32)
CREAT SERPL-MCNC: 1.01 MG/DL (ref 0.66–1.25)
CREAT UR-MCNC: 84 MG/DL
GFR SERPL CREATININE-BSD FRML MDRD: 82 ML/MIN/{1.73_M2}
GLUCOSE SERPL-MCNC: 108 MG/DL (ref 70–99)
HDLC SERPL-MCNC: 56 MG/DL
LDLC SERPL CALC-MCNC: 128 MG/DL
MICROALBUMIN UR-MCNC: 6 MG/L
MICROALBUMIN/CREAT UR: 6.59 MG/G CR (ref 0–17)
NONHDLC SERPL-MCNC: 150 MG/DL
POTASSIUM SERPL-SCNC: 3.6 MMOL/L (ref 3.4–5.3)
PROT SERPL-MCNC: 8.3 G/DL (ref 6.8–8.8)
PSA SERPL-ACNC: 6.47 UG/L (ref 0–4)
SODIUM SERPL-SCNC: 132 MMOL/L (ref 133–144)
TRIGL SERPL-MCNC: 111 MG/DL

## 2019-02-15 PROCEDURE — 36415 COLL VENOUS BLD VENIPUNCTURE: CPT | Performed by: PHYSICIAN ASSISTANT

## 2019-02-15 PROCEDURE — 80053 COMPREHEN METABOLIC PANEL: CPT | Performed by: PHYSICIAN ASSISTANT

## 2019-02-15 PROCEDURE — G0103 PSA SCREENING: HCPCS | Performed by: PHYSICIAN ASSISTANT

## 2019-02-15 PROCEDURE — 82043 UR ALBUMIN QUANTITATIVE: CPT | Performed by: PHYSICIAN ASSISTANT

## 2019-02-15 PROCEDURE — 80061 LIPID PANEL: CPT | Performed by: PHYSICIAN ASSISTANT

## 2019-02-15 PROCEDURE — 99214 OFFICE O/P EST MOD 30 MIN: CPT | Performed by: PHYSICIAN ASSISTANT

## 2019-02-15 RX ORDER — AMLODIPINE BESYLATE 10 MG/1
10 TABLET ORAL DAILY
Qty: 90 TABLET | Refills: 3 | Status: SHIPPED | OUTPATIENT
Start: 2019-02-15 | End: 2020-02-26

## 2019-02-15 RX ORDER — ALLOPURINOL 100 MG/1
100 TABLET ORAL DAILY
Qty: 90 TABLET | Refills: 3 | Status: SHIPPED | OUTPATIENT
Start: 2019-02-15 | End: 2020-03-10

## 2019-02-15 RX ORDER — LOSARTAN POTASSIUM AND HYDROCHLOROTHIAZIDE 25; 100 MG/1; MG/1
1 TABLET ORAL DAILY
Qty: 90 TABLET | Refills: 3 | Status: SHIPPED | OUTPATIENT
Start: 2019-02-15 | End: 2020-05-05

## 2019-02-15 NOTE — PROGRESS NOTES
SUBJECTIVE:   Darnell Wick is a 58 year old male who presents to clinic today for the following health issues:    Gout  Taking allopurinol  No flares    Hyperlipidemia Follow-Up      Rate your low fat/cholesterol diet?: good    Taking statin?  No    Other lipid medications/supplements?:  Fish oil/Omega 3, without side effects    Hypertension Follow-up      Outpatient blood pressures are not being checked.    Low Salt Diet: no added salt       Amount of exercise or physical activity: None outside of work    Problems taking medications regularly: No    Medication side effects: none    Diet: regular (no restrictions)    **He is also wanting to talk about vitamin D        Problem list and histories reviewed & adjusted, as indicated.  Additional history: as documented    Patient Active Problem List   Diagnosis     Idiopathic gout of multiple sites, unspecified chronicity     Hypertension, goal below 140/90     Hyperlipidemia LDL goal <130     Past Surgical History:   Procedure Laterality Date     ORTHOPEDIC SURGERY      left middle finger       Social History     Tobacco Use     Smoking status: Never Smoker     Smokeless tobacco: Never Used   Substance Use Topics     Alcohol use: Yes     Comment: Occasional, less than 1 drink per month     No family history on file.        Reviewed and updated as needed this visit by clinical staff       Reviewed and updated as needed this visit by Provider         ROS:  Constitutional, cardiovascular systems are negative, except as otherwise noted.    OBJECTIVE:                                                    There were no vitals taken for this visit.  There is no height or weight on file to calculate BMI.  GENERAL APPEARANCE: healthy, alert and no distress  RESP: lungs clear to auscultation - no rales, rhonchi or wheezes  CV: regular rates and rhythm, normal S1 S2, no S3 or S4, no murmur, click or rub, no irregular beats and no bruits heard  MS: extremities normal- no gross  deformities noted  PSYCH: mentation appears normal and affect normal/bright       ASSESSMENT:                                                      1. Hyperlipidemia LDL goal <130    2. Hypertension, goal below 140/90    3. Idiopathic gout of multiple sites, unspecified chronicity    4. Screening PSA (prostate specific antigen)    5. Colon cancer screening         PLAN:                                                    1,3) Routine labs today. Meds renewed, no changes. These conditions are stable. Follow up annually.    4,5) Screenings discussed.     Patient Instructions   You should be taking 2,000 units of vitamin D per day - at least 6-9 months of the year.       The patient was in agreement with the plan today and had no questions or concerns prior to leaving the clinic.     Jayla Trevino PA-C  [facility]

## 2019-02-20 ENCOUNTER — TELEPHONE (OUTPATIENT)
Dept: FAMILY MEDICINE | Facility: CLINIC | Age: 59
End: 2019-02-20

## 2019-02-20 DIAGNOSIS — R97.20 ELEVATED PROSTATE SPECIFIC ANTIGEN (PSA): Primary | ICD-10-CM

## 2019-02-20 NOTE — TELEPHONE ENCOUNTER
Please call patient with the following info:    Kidney function, electrolytes, liver enzymes look fine.  Cholesterol is at goal.    Your blood sugar is elevated and in the prediabetes range. Cutting back on carbs/starches is important to prevent the development of diabetes. Would you be interested in attending one of our prediabetes classes or seeing our nutritionist?     PSA is elevated - the prostate cancer screening we discussed. I'd like him to follow up with urology for further evaluation. Referral placed

## 2019-02-20 NOTE — TELEPHONE ENCOUNTER
Spoke with patient, given information from Jayla.    Patient verbalized understanding and agrees with plan.     Patient declining pre-diabetes class or nutritionist.    Given phone number to [facility]. [phone].    Nancy Pool, RN, BSN

## 2019-02-20 NOTE — TELEPHONE ENCOUNTER
Called and spoke with wife, patient still at work until 3:30 pm.    Wants RN to call back later.    Nancy Pool, RN, BSN

## 2019-02-20 NOTE — TELEPHONE ENCOUNTER
Called again at 4pm, patient had returned home from work and went to chiropractor.    Advised wife to have Ray call the clinic when he gets home.     Wife verbalized understanding and agrees with plan.    Nancy Pool, RN, BSN

## 2019-02-22 DIAGNOSIS — Z12.11 COLON CANCER SCREENING: ICD-10-CM

## 2019-02-22 LAB — HEMOCCULT STL QL IA: NEGATIVE

## 2019-02-22 PROCEDURE — 82274 ASSAY TEST FOR BLOOD FECAL: CPT | Performed by: PHYSICIAN ASSISTANT

## 2019-02-22 NOTE — LETTER
February 25, 2019      Darnell Wick  [address] [place] [phone]        Dear ,    We are writing to inform you of your test results.     Normal stool test.   Resulted Orders   Fecal colorectal cancer screen (FIT)   Result Value Ref Range    Occult Blood Scn FIT Negative NEG^Negative       If you have any questions or concerns, please call the clinic at the number listed above.       Sincerely,        Indira PAZ/farrah

## 2019-02-22 NOTE — RESULT ENCOUNTER NOTE
Please send letter, normal stool test.     Follow up as needed.       Sincerely,    JEAN PIERRE Muro, FNP-BC

## 2020-02-25 DIAGNOSIS — I10 HYPERTENSION, GOAL BELOW 140/90: ICD-10-CM

## 2020-02-25 NOTE — TELEPHONE ENCOUNTER
"Requested Prescriptions   Pending Prescriptions Disp Refills     amLODIPine (NORVASC) 10 MG tablet [Pharmacy Med Name: AMLODIPINE BESYLATE 10 MG TAB] 90 tablet 3     Sig: TAKE 1 TABLET BY MOUTH EVERY DAY  Last Written Prescription Date:  11/26/19  Last Fill Quantity: 90,  # refills: 3   Last office visit: 2/15/2019 with prescribing provider:  Belinda   Future Office Visit:         Calcium Channel Blockers Protocol  Failed - 2/25/2020  3:53 PM        Failed - Blood pressure under 140/90 in past 12 months     BP Readings from Last 3 Encounters:   02/15/19 132/68   03/30/18 139/78   03/09/18 140/80                 Failed - Recent (12 mo) or future (30 days) visit within the authorizing provider's specialty     Patient has had an office visit with the authorizing provider or a provider within the authorizing providers department within the previous 12 mos or has a future within next 30 days. See \"Patient Info\" tab in inbasket, or \"Choose Columns\" in Meds & Orders section of the refill encounter.              Failed - Normal serum creatinine on file in past 12 months     Recent Labs   Lab Test 02/15/19  0920   CR 1.01             Passed - Medication is active on med list        Passed - Patient is age 18 or older        "

## 2020-02-26 NOTE — TELEPHONE ENCOUNTER
Routing refill request to provider for review/approval because:  Patient needs to be seen because it has been more than 1 year since last office visit.  Last OV 2/15/19, due 2/15/20    Med pended for 30 day supply with reminder

## 2020-02-27 RX ORDER — AMLODIPINE BESYLATE 10 MG/1
TABLET ORAL
Qty: 30 TABLET | Refills: 0 | Status: SHIPPED | OUTPATIENT
Start: 2020-02-27 | End: 2020-03-22

## 2020-03-09 DIAGNOSIS — M10.09 IDIOPATHIC GOUT OF MULTIPLE SITES, UNSPECIFIED CHRONICITY: ICD-10-CM

## 2020-03-09 NOTE — TELEPHONE ENCOUNTER
"Requested Prescriptions   Pending Prescriptions Disp Refills     allopurinol (ZYLOPRIM) 100 MG tablet [Pharmacy Med Name: ALLOPURINOL 100 MG TABLET] 90 tablet 3     Sig: TAKE 1 TABLET BY MOUTH EVERY DAY   Last Written Prescription Date:  12-10-20  Last Fill Quantity: 90,  # refills: 3   Last office visit: 2/15/2019 with prescribing provider:  2-15-19   Future Office Visit:      Gout Agents Protocol Failed - 3/9/2020  2:15 AM        Failed - CBC on file in past 12 months     Recent Labs   Lab Test 04/11/16  1549   WBC 7.5   RBC 4.79   HGB 15.9   HCT 46.3                    Failed - ALT on file in past 12 months     Recent Labs   Lab Test 02/15/19  0920   ALT 46             Failed - Has Uric Acid on file in past 12 months and value is less than 6     Recent Labs   Lab Test 04/11/16  1549   URIC 5.0     If level is 6mg/dL or greater, ok to refill one time and refer to provider.           Failed - Recent (12 mo) or future (30 days) visit within the authorizing provider's specialty     Patient has had an office visit with the authorizing provider or a provider within the authorizing providers department within the previous 12 mos or has a future within next 30 days. See \"Patient Info\" tab in inbasket, or \"Choose Columns\" in Meds & Orders section of the refill encounter.              Failed - Normal serum creatinine on file in the past 12 months     Recent Labs   Lab Test 02/15/19  0920   CR 1.01             Passed - Medication is active on med list        Passed - Patient is age 18 or older           "

## 2020-03-10 NOTE — TELEPHONE ENCOUNTER
Routing refill request to provider for review/approval because:  Labs not current  Patient needs to be seen because it has been more than 1 year since last office visit.  2/15/19    Medication pended for approval, 30 day supply with reminder.

## 2020-03-11 RX ORDER — ALLOPURINOL 100 MG/1
TABLET ORAL
Qty: 30 TABLET | Refills: 0 | Status: SHIPPED | OUTPATIENT
Start: 2020-03-11 | End: 2020-04-07

## 2020-03-21 DIAGNOSIS — I10 HYPERTENSION, GOAL BELOW 140/90: ICD-10-CM

## 2020-03-21 NOTE — LETTER
March 25, 2020        Darnell Wick  [address] [place] [phone]      Dear Bryn,    Your medication has been approved for # 90 day supply.    However, you are due for a follow up appointment for further refills. Please schedule this visit at your earliest convenience.    Thank you.      Jayla Trevino PA-C/farrah

## 2020-03-22 NOTE — TELEPHONE ENCOUNTER
"Requested Prescriptions   Pending Prescriptions Disp Refills     amLODIPine (NORVASC) 10 MG tablet [Pharmacy Med Name: AMLODIPINE BESYLATE 10 MG TAB] 30 tablet 0     Sig: TAKE 1 TABLET BY MOUTH EVERY DAY   Last Written Prescription Date:  02/27/20  Last Fill Quantity: 30,  # refills: 0   Last office visit: 2/15/2019 with prescribing provider:  OTONIEL Trevino   Future Office Visit:        Calcium Channel Blockers Protocol  Failed - 3/21/2020 12:34 PM        Failed - Blood pressure under 140/90 in past 12 months     BP Readings from Last 3 Encounters:   02/15/19 132/68   03/30/18 139/78   03/09/18 140/80                 Failed - Recent (12 mo) or future (30 days) visit within the authorizing provider's specialty     Patient has had an office visit with the authorizing provider or a provider within the authorizing providers department within the previous 12 mos or has a future within next 30 days. See \"Patient Info\" tab in inbasket, or \"Choose Columns\" in Meds & Orders section of the refill encounter.              Failed - Normal serum creatinine on file in past 12 months     Recent Labs   Lab Test 02/15/19  0920   CR 1.01       Ok to refill medication if creatinine is low          Passed - Medication is active on med list        Passed - Patient is age 18 or older             "

## 2020-03-23 RX ORDER — AMLODIPINE BESYLATE 10 MG/1
TABLET ORAL
Qty: 90 TABLET | Refills: 0 | Status: SHIPPED | OUTPATIENT
Start: 2020-03-23 | End: 2020-06-17

## 2020-03-23 NOTE — TELEPHONE ENCOUNTER
Will push follow up out 3 months, please notify patient he will need to be seen for the next med refill

## 2020-03-23 NOTE — TELEPHONE ENCOUNTER
Routing refill request to provider for review/approval because:  Jen given x1 and patient did not follow up, please advise  Labs not current:  bp and cr  Patient needs to be seen because it has been more than 1 year since last office visit.    Medication pended for approval, 30 day supply with reminder.

## 2020-04-04 DIAGNOSIS — M10.09 IDIOPATHIC GOUT OF MULTIPLE SITES, UNSPECIFIED CHRONICITY: ICD-10-CM

## 2020-04-06 NOTE — TELEPHONE ENCOUNTER
"Requested Prescriptions   Pending Prescriptions Disp Refills     allopurinol (ZYLOPRIM) 100 MG tablet [Pharmacy Med Name: ALLOPURINOL 100 MG TABLET] 30 tablet 0     Sig: TAKE 1 TABLET BY MOUTH EVERY DAY  Last Written Prescription Date:  3/11/20  Last Fill Quantity: 30,  # refills: 0   Last office visit: 2/15/2019 with prescribing provider:  Belinda  Future Office Visit:         Gout Agents Protocol Failed - 4/4/2020  9:13 AM        Failed - CBC on file in past 12 months     Recent Labs   Lab Test 04/11/16  1549   WBC 7.5   RBC 4.79   HGB 15.9   HCT 46.3                    Failed - ALT on file in past 12 months     Recent Labs   Lab Test 02/15/19  0920   ALT 46             Failed - Has Uric Acid on file in past 12 months and value is less than 6     Recent Labs   Lab Test 04/11/16  1549   URIC 5.0     If level is 6mg/dL or greater, ok to refill one time and refer to provider.           Failed - Recent (12 mo) or future (30 days) visit within the authorizing provider's specialty     Patient has had an office visit with the authorizing provider or a provider within the authorizing providers department within the previous 12 mos or has a future within next 30 days. See \"Patient Info\" tab in inbasket, or \"Choose Columns\" in Meds & Orders section of the refill encounter.              Failed - Normal serum creatinine on file in the past 12 months     Recent Labs   Lab Test 02/15/19  0920   CR 1.01       Ok to refill medication if creatinine is low          Passed - Medication is active on med list        Passed - Patient is age 18 or older           "

## 2020-04-07 RX ORDER — ALLOPURINOL 100 MG/1
TABLET ORAL
Qty: 30 TABLET | Refills: 0 | Status: SHIPPED | OUTPATIENT
Start: 2020-04-07 | End: 2020-05-06

## 2020-04-07 NOTE — TELEPHONE ENCOUNTER
Routing refill request to provider for review/approval because:  Labs not current:  CBC, Uric Acid, Creatinine, ALT  It has been more than one year since last office visit.    Last OV with Jayal: 2/15/2019 with yearly F/U advised.    Nancy Pool, RN, BSN

## 2020-05-04 ENCOUNTER — TELEPHONE (OUTPATIENT)
Dept: FAMILY MEDICINE | Facility: CLINIC | Age: 60
End: 2020-05-04

## 2020-05-04 DIAGNOSIS — M10.09 IDIOPATHIC GOUT OF MULTIPLE SITES, UNSPECIFIED CHRONICITY: ICD-10-CM

## 2020-05-04 RX ORDER — ALLOPURINOL 100 MG/1
100 TABLET ORAL DAILY
Qty: 30 TABLET | Refills: 0 | Status: CANCELLED | OUTPATIENT
Start: 2020-05-04

## 2020-05-04 NOTE — TELEPHONE ENCOUNTER
Routing refill request to provider for review/approval because:  Jen given x1 and patient did not follow up, please advise  Patient needs to be seen because it has been more than 1 year since last office visit.

## 2020-05-04 NOTE — TELEPHONE ENCOUNTER
Reason for Call:  Other prescription    Detailed comments: pharmacy is calling to check on status of RX: allopurinol (ZYLOPRIM) 100 MG tablet, please call to advise. Thank you.    Phone Number Patient can be reached at: [phone]    Best Time:     Can we leave a detailed message on this number? NO    Call taken on 5/4/2020 at 3:23 PM by Wanda Marie

## 2020-05-05 NOTE — TELEPHONE ENCOUNTER
Call to patient, per female patient is at work. Will try again at later time.  Informed [facility] pharmacy patient is due for appointment.

## 2020-05-05 NOTE — PROGRESS NOTES
"Darnell Wick is a 60 year old male who is being evaluated via a billable telephone visit.      The patient has been notified of following:     \"This telephone visit will be conducted via a call between you and your physician/provider. We have found that certain health care needs can be provided without the need for a physical exam.  This service lets us provide the care you need with a short phone conversation.  If a prescription is necessary we can send it directly to your pharmacy.  If lab work is needed we can place an order for that and you can then stop by our lab to have the test done at a later time.    Telephone visits are billed at different rates depending on your insurance coverage. During this emergency period, for some insurers they may be billed the same as an in-person visit.  Please reach out to your insurance provider with any questions.    If during the course of the call the physician/provider feels a telephone visit is not appropriate, you will not be charged for this service.\"    Patient has given verbal consent for Telephone visit?  Yes    What phone number would you like to be contacted at? [phone]    How would you like to obtain your AVS? Mail a copy    Subjective     Darnell Wick is a 60 year old male who presents to clinic today for the following health issues:    HPI  Hypertension Follow-up      Do you check your blood pressure regularly outside of the clinic? No     Are you following a low salt diet? Yes    Are your blood pressures ever more than 140 on the top number (systolic) OR more   than 90 on the bottom number (diastolic), for example 140/90? na      How many servings of fruits and vegetables do you eat daily?  0-1    On average, how many sweetened beverages do you drink each day (Examples: soda, juice, sweet tea, etc.  Do NOT count diet or artificially sweetened beverages)?   3    How many days per week do you exercise enough to make your heart beat faster? 3 or " less    How many minutes a day do you exercise enough to make your heart beat faster? 9 or less    How many days per week do you miss taking your medication? 0        PROBLEMS TO ADD ON...  Gout, uses allopurinol      Patient Active Problem List   Diagnosis     Idiopathic gout of multiple sites, unspecified chronicity     Hypertension, goal below 140/90     Hyperlipidemia LDL goal <130     Past Surgical History:   Procedure Laterality Date     ORTHOPEDIC SURGERY      left middle finger       Social History     Tobacco Use     Smoking status: Never Smoker     Smokeless tobacco: Never Used   Substance Use Topics     Alcohol use: Yes     Comment: Occasional, less than 1 drink per month     History reviewed. No pertinent family history.        Reviewed and updated as needed this visit by Provider         Review of Systems   ROS COMP: Constitutional, cardiovascular systems are negative, except as otherwise noted.       Objective   Reported vitals:  There were no vitals taken for this visit.   healthy, alert and no distress  PSYCH: Alert and oriented times 3; coherent speech, normal   rate and volume, able to articulate logical thoughts, able   to abstract reason, no tangential thoughts, no hallucinations   or delusions  His affect is normal and pleasant  RESP: No cough, no audible wheezing, able to talk in full sentences  Remainder of exam unable to be completed due to telephone visits        Assessment/Plan:  1. Hypertension, goal below 140/90    2. Idiopathic gout of multiple sites, unspecified chronicity    3. Hyperlipidemia LDL goal <130    4. Elevated fasting glucose    5. Screening PSA (prostate specific antigen)         1-4) Meds renewed, no changes, he feels well. Patient scheduled for fasting labs in 3 weeks. Will plan to get him back in the clinic for a physical in 6-12 months.     5) Screening measure.    Return in about 3 weeks (around 5/27/2020) for repeat labs.      Phone call duration:  7 minutes    Jayla  Юлия Trevino PA-C

## 2020-05-06 ENCOUNTER — VIRTUAL VISIT (OUTPATIENT)
Dept: FAMILY MEDICINE | Facility: CLINIC | Age: 60
End: 2020-05-06
Payer: COMMERCIAL

## 2020-05-06 DIAGNOSIS — I10 HYPERTENSION, GOAL BELOW 140/90: Primary | ICD-10-CM

## 2020-05-06 DIAGNOSIS — E78.5 HYPERLIPIDEMIA LDL GOAL <130: ICD-10-CM

## 2020-05-06 DIAGNOSIS — Z12.5 SCREENING PSA (PROSTATE SPECIFIC ANTIGEN): ICD-10-CM

## 2020-05-06 DIAGNOSIS — M10.09 IDIOPATHIC GOUT OF MULTIPLE SITES, UNSPECIFIED CHRONICITY: ICD-10-CM

## 2020-05-06 DIAGNOSIS — R73.01 ELEVATED FASTING GLUCOSE: ICD-10-CM

## 2020-05-06 PROCEDURE — 99214 OFFICE O/P EST MOD 30 MIN: CPT | Mod: TEL | Performed by: PHYSICIAN ASSISTANT

## 2020-05-06 RX ORDER — HYDROCHLOROTHIAZIDE 25 MG/1
25 TABLET ORAL DAILY
Qty: 90 TABLET | Refills: 0 | Status: SHIPPED | OUTPATIENT
Start: 2020-05-06 | End: 2020-05-28

## 2020-05-06 RX ORDER — ALLOPURINOL 100 MG/1
100 TABLET ORAL DAILY
Qty: 90 TABLET | Refills: 0 | Status: SHIPPED | OUTPATIENT
Start: 2020-05-06 | End: 2020-07-07

## 2020-05-06 RX ORDER — LOSARTAN POTASSIUM 100 MG/1
100 TABLET ORAL DAILY
Qty: 90 TABLET | Refills: 0 | Status: SHIPPED | OUTPATIENT
Start: 2020-05-06 | End: 2020-09-04

## 2020-05-27 DIAGNOSIS — R73.01 ELEVATED FASTING GLUCOSE: ICD-10-CM

## 2020-05-27 DIAGNOSIS — E78.5 HYPERLIPIDEMIA LDL GOAL <130: ICD-10-CM

## 2020-05-27 DIAGNOSIS — M10.09 IDIOPATHIC GOUT OF MULTIPLE SITES, UNSPECIFIED CHRONICITY: ICD-10-CM

## 2020-05-27 DIAGNOSIS — I10 HYPERTENSION, GOAL BELOW 140/90: ICD-10-CM

## 2020-05-27 DIAGNOSIS — Z12.5 SCREENING PSA (PROSTATE SPECIFIC ANTIGEN): ICD-10-CM

## 2020-05-27 LAB
ALBUMIN SERPL-MCNC: 4.5 G/DL (ref 3.4–5)
ALP SERPL-CCNC: 75 U/L (ref 40–150)
ALT SERPL W P-5'-P-CCNC: 67 U/L (ref 0–70)
ANION GAP SERPL CALCULATED.3IONS-SCNC: 10 MMOL/L (ref 3–14)
AST SERPL W P-5'-P-CCNC: 57 U/L (ref 0–45)
BILIRUB SERPL-MCNC: 1.9 MG/DL (ref 0.2–1.3)
BUN SERPL-MCNC: 27 MG/DL (ref 7–30)
CALCIUM SERPL-MCNC: 9 MG/DL (ref 8.5–10.1)
CHLORIDE SERPL-SCNC: 93 MMOL/L (ref 94–109)
CHOLEST SERPL-MCNC: 237 MG/DL
CO2 SERPL-SCNC: 25 MMOL/L (ref 20–32)
CREAT SERPL-MCNC: 1.24 MG/DL (ref 0.66–1.25)
CREAT UR-MCNC: 220 MG/DL
GFR SERPL CREATININE-BSD FRML MDRD: 64 ML/MIN/{1.73_M2}
GLUCOSE SERPL-MCNC: 102 MG/DL (ref 70–99)
HBA1C MFR BLD: 5.3 % (ref 0–5.6)
HDLC SERPL-MCNC: 73 MG/DL
LDLC SERPL CALC-MCNC: 146 MG/DL
MICROALBUMIN UR-MCNC: 26 MG/L
MICROALBUMIN/CREAT UR: 11.64 MG/G CR (ref 0–17)
NONHDLC SERPL-MCNC: 164 MG/DL
POTASSIUM SERPL-SCNC: 3.2 MMOL/L (ref 3.4–5.3)
PROT SERPL-MCNC: 8.4 G/DL (ref 6.8–8.8)
PSA SERPL-ACNC: 4.76 UG/L (ref 0–4)
SODIUM SERPL-SCNC: 128 MMOL/L (ref 133–144)
TRIGL SERPL-MCNC: 91 MG/DL

## 2020-05-27 PROCEDURE — 83036 HEMOGLOBIN GLYCOSYLATED A1C: CPT | Performed by: PHYSICIAN ASSISTANT

## 2020-05-27 PROCEDURE — 80061 LIPID PANEL: CPT | Performed by: PHYSICIAN ASSISTANT

## 2020-05-27 PROCEDURE — 80053 COMPREHEN METABOLIC PANEL: CPT | Performed by: PHYSICIAN ASSISTANT

## 2020-05-27 PROCEDURE — 36415 COLL VENOUS BLD VENIPUNCTURE: CPT | Performed by: PHYSICIAN ASSISTANT

## 2020-05-27 PROCEDURE — 82043 UR ALBUMIN QUANTITATIVE: CPT | Performed by: PHYSICIAN ASSISTANT

## 2020-05-27 PROCEDURE — G0103 PSA SCREENING: HCPCS | Performed by: PHYSICIAN ASSISTANT

## 2020-05-28 ENCOUNTER — MYC MEDICAL ADVICE (OUTPATIENT)
Dept: FAMILY MEDICINE | Facility: CLINIC | Age: 60
End: 2020-05-28

## 2020-05-28 DIAGNOSIS — I10 HYPERTENSION, GOAL BELOW 140/90: ICD-10-CM

## 2020-05-28 RX ORDER — HYDROCHLOROTHIAZIDE 25 MG/1
12.5 TABLET ORAL DAILY
Qty: 90 TABLET | Refills: 0
Start: 2020-05-28 | End: 2020-12-23

## 2020-05-28 NOTE — TELEPHONE ENCOUNTER
Please call patient with the following info:    I'd like to follow up with patient regarding his labs. Virtual visit ok, please schedule. In the meantime, decrease hydrochlorothiazide to 12.5mg (half tab) daily. Electrolytes are low.

## 2020-06-01 NOTE — TELEPHONE ENCOUNTER
Directives from Jayla Trevino reviewed with patient and he verbalized a good understanding to decrease his hydrochlorothiazide to 1/2 tablet (12.5 mg) daily. Ray states that he will call back to schedule a telephone visit with Jayla.    Tatum Crocker RN BSN

## 2020-06-11 ENCOUNTER — TELEPHONE (OUTPATIENT)
Dept: FAMILY MEDICINE | Facility: CLINIC | Age: 60
End: 2020-06-11

## 2020-06-11 DIAGNOSIS — R97.20 ELEVATED PROSTATE SPECIFIC ANTIGEN (PSA): Primary | ICD-10-CM

## 2020-06-11 DIAGNOSIS — E87.1 HYPONATREMIA: ICD-10-CM

## 2020-06-11 NOTE — TELEPHONE ENCOUNTER
No need for follow up visit with me, but he needs a repeat blood pressure on ancillary and repeat non fasting labs (BMP and follow up PSA testing)

## 2020-06-11 NOTE — TELEPHONE ENCOUNTER
Patient states he is feeling just fine with the decreased dosage of hydrochlorothiazide and if you still want a telephone visit with him, let him know.    Thank you.

## 2020-06-11 NOTE — TELEPHONE ENCOUNTER
Spoke with patient, requested OV with Jayla since he has to come in clinic anyway. Face to face visit scheduled for 6/22/20 with Jayla.

## 2020-07-23 ENCOUNTER — OFFICE VISIT (OUTPATIENT)
Dept: FAMILY MEDICINE | Facility: CLINIC | Age: 60
End: 2020-07-23
Payer: COMMERCIAL

## 2020-07-23 VITALS
SYSTOLIC BLOOD PRESSURE: 148 MMHG | WEIGHT: 199.38 LBS | HEIGHT: 68 IN | BODY MASS INDEX: 30.22 KG/M2 | TEMPERATURE: 98.7 F | DIASTOLIC BLOOD PRESSURE: 76 MMHG | OXYGEN SATURATION: 99 % | RESPIRATION RATE: 20 BRPM | HEART RATE: 64 BPM

## 2020-07-23 DIAGNOSIS — M10.09 IDIOPATHIC GOUT OF MULTIPLE SITES, UNSPECIFIED CHRONICITY: ICD-10-CM

## 2020-07-23 DIAGNOSIS — I10 HYPERTENSION, GOAL BELOW 140/90: Primary | ICD-10-CM

## 2020-07-23 DIAGNOSIS — Z12.11 COLON CANCER SCREENING: ICD-10-CM

## 2020-07-23 DIAGNOSIS — R97.20 ELEVATED PROSTATE SPECIFIC ANTIGEN (PSA): ICD-10-CM

## 2020-07-23 LAB
ALBUMIN SERPL-MCNC: 4.3 G/DL (ref 3.4–5)
ALP SERPL-CCNC: 66 U/L (ref 40–150)
ALT SERPL W P-5'-P-CCNC: 65 U/L (ref 0–70)
ANION GAP SERPL CALCULATED.3IONS-SCNC: 11 MMOL/L (ref 3–14)
AST SERPL W P-5'-P-CCNC: 50 U/L (ref 0–45)
BILIRUB SERPL-MCNC: 1.9 MG/DL (ref 0.2–1.3)
BUN SERPL-MCNC: 19 MG/DL (ref 7–30)
CALCIUM SERPL-MCNC: 9.2 MG/DL (ref 8.5–10.1)
CHLORIDE SERPL-SCNC: 97 MMOL/L (ref 94–109)
CO2 SERPL-SCNC: 23 MMOL/L (ref 20–32)
CREAT SERPL-MCNC: 1.13 MG/DL (ref 0.66–1.25)
GFR SERPL CREATININE-BSD FRML MDRD: 71 ML/MIN/{1.73_M2}
GLUCOSE SERPL-MCNC: 93 MG/DL (ref 70–99)
POTASSIUM SERPL-SCNC: 3.7 MMOL/L (ref 3.4–5.3)
PROT SERPL-MCNC: 8.1 G/DL (ref 6.8–8.8)
SODIUM SERPL-SCNC: 131 MMOL/L (ref 133–144)
URATE SERPL-MCNC: 3.4 MG/DL (ref 3.5–7.2)

## 2020-07-23 PROCEDURE — 90471 IMMUNIZATION ADMIN: CPT | Performed by: PHYSICIAN ASSISTANT

## 2020-07-23 PROCEDURE — 90715 TDAP VACCINE 7 YRS/> IM: CPT | Performed by: PHYSICIAN ASSISTANT

## 2020-07-23 PROCEDURE — 84550 ASSAY OF BLOOD/URIC ACID: CPT | Performed by: PHYSICIAN ASSISTANT

## 2020-07-23 PROCEDURE — 84153 ASSAY OF PSA TOTAL: CPT | Mod: 90 | Performed by: PHYSICIAN ASSISTANT

## 2020-07-23 PROCEDURE — 99000 SPECIMEN HANDLING OFFICE-LAB: CPT | Performed by: PHYSICIAN ASSISTANT

## 2020-07-23 PROCEDURE — 84154 ASSAY OF PSA FREE: CPT | Mod: 90 | Performed by: PHYSICIAN ASSISTANT

## 2020-07-23 PROCEDURE — 36415 COLL VENOUS BLD VENIPUNCTURE: CPT | Performed by: PHYSICIAN ASSISTANT

## 2020-07-23 PROCEDURE — 99214 OFFICE O/P EST MOD 30 MIN: CPT | Mod: 25 | Performed by: PHYSICIAN ASSISTANT

## 2020-07-23 PROCEDURE — 80053 COMPREHEN METABOLIC PANEL: CPT | Performed by: PHYSICIAN ASSISTANT

## 2020-07-23 ASSESSMENT — MIFFLIN-ST. JEOR: SCORE: 1698.86

## 2020-07-23 NOTE — LETTER
July 28, 2020      Darnell Wick  [address] [place] [id number]        Dear ,    We are writing to inform you of your test results.    Your free PSA shows a very low likelihood of prostate cancer. I believe your PSA remains elevated due to enlargement of the gland. This commonly occurs over time. I'd like to recheck this again in 6 months.   Your electrolytes have improved - continue your new medication regimen.   Liver enzymes are stable over the last 2-3 years.   Uric acid level is low which puts you at a low risk for recurrent gout attacks.     Resulted Orders   PSA, total and free   Result Value Ref Range    PSA Free 1.7 ng/mL    Prostate Specific Antigen 4.9 (H) 0.0 - 4.0 ng/mL      Comment:      (Note)  INTERPRETIVE INFORMATION: Prostate Specific Antigen  The Roche PSA electrochemiluminescent immunoassay was used.   Results obtained with different test methods or kits cannot   be used interchangeably. The Roche PSA method is approved   for use as an aid in the detection of prostate cancer when   used in conjunction with a digital rectal exam in men age   50 and older. The Roche PSA method is also indicated for   the serial measurement of PSA to aid in the prognosis and   management of prostate cancer patients. Elevated PSA   concentrations can only suggest the presence of prostate   cancer until biopsy is performed. PSA concentrations can   also be elevated in benign prostatic hyperplasia or   inflammatory conditions of the prostate. PSA is generally   not elevated in healthy men or men with non-prostatic   carcinoma.      PSAPCT 35 %      Comment:      (Note)  INTERPRETIVE INFORMATION: Prostate Specific Antigen, Free   Percentage  [facility] uses the Roche Free PSA electrochemiluminescent   immunoassay method in conjunction with the Roche PSA   electrochemiluminescent immunoassay method to determine the   free PSA percentage. Values obtained with different assay   methods should not be used  interchangeably. The free PSA   percentage is an aid in distinguishing prostate cancer from   benign prostatic conditions in men age 50 and older with a   total PSA between 3 and 10 ng/mL and negative digital   rectal examination findings. Prostatic biopsy is required   for the diagnosis of cancer.  In patients with total PSA concentrations of 4-10 ng/mL,   the probability of finding prostate cancer on needle biopsy   by age in years is:  %fPSA               50-59    60-69    70 or older  0  - 10%            49%      58%      65%  11 - 18%            27%      34%      41%  19 - 25%            18%      24%      30%  Greater than 25%     9%      12%        16%  Other factors may help determine the actual risk of   prostate cancer in individual patients.  Performed By: [employer]  [address] [place] [id number]  : Haroon Bennett MD, MS     Comprehensive metabolic panel   Result Value Ref Range    Sodium 131 (L) 133 - 144 mmol/L    Potassium 3.7 3.4 - 5.3 mmol/L    Chloride 97 94 - 109 mmol/L    Carbon Dioxide 23 20 - 32 mmol/L    Anion Gap 11 3 - 14 mmol/L    Glucose 93 70 - 99 mg/dL      Comment:      Non Fasting    Urea Nitrogen 19 7 - 30 mg/dL    Creatinine 1.13 0.66 - 1.25 mg/dL    GFR Estimate 71 >60 mL/min/[1.73_m2]      Comment:      Non  GFR Calc  Starting 12/18/2018, serum creatinine based estimated GFR (eGFR) will be   calculated using the Chronic Kidney Disease Epidemiology Collaboration   (CKD-EPI) equation.      GFR Estimate If Black 82 >60 mL/min/[1.73_m2]      Comment:       GFR Calc  Starting 12/18/2018, serum creatinine based estimated GFR (eGFR) will be   calculated using the Chronic Kidney Disease Epidemiology Collaboration   (CKD-EPI) equation.      Calcium 9.2 8.5 - 10.1 mg/dL    Bilirubin Total 1.9 (H) 0.2 - 1.3 mg/dL    Albumin 4.3 3.4 - 5.0 g/dL    Protein Total 8.1 6.8 - 8.8 g/dL    Alkaline Phosphatase 66 40 - 150 U/L     ALT 65 0 - 70 U/L    AST 50 (H) 0 - 45 U/L   Uric acid   Result Value Ref Range    Uric Acid 3.4 (L) 3.5 - 7.2 mg/dL       If you have any questions or concerns, please call the clinic at the number listed above.       Sincerely,        Jayla Trevino PA-C/rogerso

## 2020-07-23 NOTE — Clinical Note
Dr. Phillips,   I'd like your advice: this patient is on the max dose of amlodipine and losartan. I decreased his hydrochlorothiazide from 25 to 12.5mg due to hyponatremia and hypokalemia. GFR in the 60's. I was thinking of adding Coreg XR, but his pulse is in the mid-upper 60's. How much with carvedilol affect his pulse? Is it worth trying?  Appreciate any advice!  Jayla

## 2020-07-23 NOTE — PROGRESS NOTES
"Subjective     Darnell Wick is a 60 year old male who presents to clinic today for the following health issues:    HPI     Hypertension Follow-up      Do you check your blood pressure regularly outside of the clinic? No     Are you following a low salt diet? Yes    Are your blood pressures ever more than 140 on the top number (systolic) OR more   than 90 on the bottom number (diastolic), for example 140/90? Yes      How many servings of fruits and vegetables do you eat daily?  0-1    On average, how many sweetened beverages do you drink each day (Examples: soda, juice, sweet tea, etc.  Do NOT count diet or artificially sweetened beverages)?   2    How many days per week do you exercise enough to make your heart beat faster? 3 or less    How many minutes a day do you exercise enough to make your heart beat faster? 9 or less    How many days per week do you miss taking your medication? 0    Elevated PSA recently       Patient Active Problem List   Diagnosis     Idiopathic gout of multiple sites, unspecified chronicity     Hypertension, goal below 140/90     Hyperlipidemia LDL goal <130     Past Surgical History:   Procedure Laterality Date     ORTHOPEDIC SURGERY      left middle finger       Social History     Tobacco Use     Smoking status: Never Smoker     Smokeless tobacco: Never Used   Substance Use Topics     Alcohol use: Yes     Comment: Occasional, less than 1 drink per month     No family history on file.        Reviewed and updated as needed this visit by Provider         Review of Systems   Constitutional, cardiovascular,  systems are negative, except as otherwise noted.      Objective    BP (!) 148/76   Pulse 64   Temp 98.7  F (37.1  C) (Tympanic)   Resp 20   Ht 1.727 m (5' 8\")   Wt 90.4 kg (199 lb 6 oz)   SpO2 99%   BMI 30.31 kg/m    Body mass index is 30.31 kg/m .  Physical Exam   GENERAL: healthy, alert and no distress  RESP: lungs clear to auscultation - no rales, rhonchi or wheezes  CV: " "regular rates and rhythm, normal S1 S2, no S3 or S4, no murmur, click or rub and no bruits heard   MS: no gross musculoskeletal defects noted, no edema  SKIN: no suspicious lesions or rashes  NEURO: Normal strength and tone, mentation intact and speech normal  PSYCH: mentation appears normal, affect normal/bright        Assessment & Plan   Assessment  1. Hypertension, goal below 140/90    2. Idiopathic gout of multiple sites, unspecified chronicity    3. Elevated prostate specific antigen (PSA)    4. Colon cancer screening         Plan  1) Repeat CMP today. He has decreased his hydrochlorothiazide to 12.5mg due to low electrolytes, but his blood pressure is elevated. Will check with cardiology regarding other ideas. Wary of a beta blocker due to his pulse being in the low 60's already.     2,3) Repeat labs today. He denies urinary symptoms.      BMI:   Estimated body mass index is 30.31 kg/m  as calculated from the following:    Height as of this encounter: 1.727 m (5' 8\").    Weight as of this encounter: 90.4 kg (199 lb 6 oz).       Return for pending lab results.    Jayla Trevino PA-C  [facility]      "

## 2020-07-24 LAB
PSA FREE MFR SERPL: 35 %
PSA FREE SERPL-MCNC: 1.7 NG/ML
PSA SERPL-MCNC: 4.9 NG/ML (ref 0–4)

## 2020-07-27 ENCOUNTER — TELEPHONE (OUTPATIENT)
Dept: FAMILY MEDICINE | Facility: CLINIC | Age: 60
End: 2020-07-27

## 2020-07-27 DIAGNOSIS — R97.20 ELEVATED PROSTATE SPECIFIC ANTIGEN (PSA): Primary | ICD-10-CM

## 2020-07-27 DIAGNOSIS — I10 HYPERTENSION, GOAL BELOW 140/90: Primary | ICD-10-CM

## 2020-07-27 RX ORDER — CARVEDILOL 12.5 MG/1
12.5 TABLET ORAL 2 TIMES DAILY WITH MEALS
Qty: 180 TABLET | Refills: 0 | Status: SHIPPED | OUTPATIENT
Start: 2020-07-27 | End: 2020-08-25

## 2020-07-27 NOTE — RESULT ENCOUNTER NOTE
Please send the following letter to the patient:    Darnell,    Your free PSA shows a very low likelihood of prostate cancer. I believe your PSA remains elevated due to enlargement of the gland. This commonly occurs over time. I'd like to recheck this again in 6 months.   Your electrolytes have improved - continue your new medication regimen.   Liver enzymes are stable over the last 2-3 years.   Uric acid level is low which puts you at a low risk for recurrent gout attacks.    Please call me with any questions or concerns.          Jayla Trevino PA-C

## 2020-07-27 NOTE — TELEPHONE ENCOUNTER
Left message on voice mail for patient to call clinic. [phone]    Patient needs to speak with nurse and TC to schedule BP check.    Tatum Crocker RN BSN

## 2020-07-27 NOTE — TELEPHONE ENCOUNTER
Please call patient with the following info:  Cardiology recommends adding carvedilol for blood pressure. Recheck blood pressure on ancillary after ~2 weeks on med. Please schedule.    Recs from cards:  His pulse rate is not bad. Start with carvedilol 12.5 mg bid. You can give spironolactone but can cause hypogonadism in men. I sometimes add imdur for refractory cases.   But his BP is not that bad, can respond to carvedilol.

## 2020-07-29 NOTE — TELEPHONE ENCOUNTER
See Note below.    Left message on voice mail for patient to call clinic. [phone]      Tatum Crocker RN BSN

## 2020-07-30 NOTE — TELEPHONE ENCOUNTER
Directives from Jayla Trevino reviewed with patient and he verbalized a good understanding.     Ray states that he has already picked up the Carvedilol and started it a few days ago.     Patient  will call back to schedule a blood pressure check (around August 12).     Tatum Crocker RN BSN

## 2020-08-18 ENCOUNTER — TELEPHONE (OUTPATIENT)
Dept: FAMILY MEDICINE | Facility: CLINIC | Age: 60
End: 2020-08-18

## 2020-08-18 NOTE — TELEPHONE ENCOUNTER
Per chart, should have refills available.  Per pharmacy he should have meds, insurance will not fill until until 9/1.  Per pharmacy if he does not find another pill bottle, he is to call pharmacy and they will help.  Spoke with patient he did find his bottle, he does not need refill yet, he was using up his old prescription.  Krystal Paniagua RN

## 2020-08-18 NOTE — TELEPHONE ENCOUNTER
Patient is calling regarding his rx for amLODIPine (NORVASC) 10 MG tablet.  The pharmacy will not refill it until October and he is out of this medication.  Please call to advise.  Thank you  Caller informed that calls received after 3pm may not be returned same day.

## 2020-08-19 ENCOUNTER — ALLIED HEALTH/NURSE VISIT (OUTPATIENT)
Dept: NURSING | Facility: CLINIC | Age: 60
End: 2020-08-19
Payer: COMMERCIAL

## 2020-08-19 ENCOUNTER — TELEPHONE (OUTPATIENT)
Dept: FAMILY MEDICINE | Facility: CLINIC | Age: 60
End: 2020-08-19

## 2020-08-19 VITALS — SYSTOLIC BLOOD PRESSURE: 152 MMHG | DIASTOLIC BLOOD PRESSURE: 72 MMHG

## 2020-08-19 DIAGNOSIS — I10 HYPERTENSION, GOAL BELOW 140/90: ICD-10-CM

## 2020-08-19 DIAGNOSIS — I10 HYPERTENSION, GOAL BELOW 140/90: Primary | ICD-10-CM

## 2020-08-19 PROCEDURE — 99207 ZZC NO CHARGE NURSE ONLY: CPT

## 2020-08-19 NOTE — TELEPHONE ENCOUNTER
Pt at [facility] for a blood pressure check after starting a new blood pressure medicine.     bp readings today of 148/90, 142/82, and 152/72 with pt sitting quietly at least 5 min between readings.    Pt denies any side effects from the medications.  He did have diarrhea from the carvedilol but it resolved with time. He denies any hypertension sx now.      Pt is taking amlodipine 10 mg daily, hydrochlorothiazide 12.5 mg daily, losartan 100 mg daily and started carvedilol 12.5 mg bid at the end of July.    To provider to advise.  Lauren LARIOSN, RN

## 2020-08-19 NOTE — PROGRESS NOTES
Pt denies any side effects from the medications.  He did have diarrhea from the carvedilol but it resolved with time. He denies any hypertension sx now.      Pt is taking amlodipine 10 mg daily, hydrochlorothiazide 12.5 mg daily, losartan 100 mg daily and started carvedilol 12.5 mg bid at the end of July.    See phone message. Information sent to provider for any further directions.  Lauren LARIOSN, RN

## 2020-08-19 NOTE — PROGRESS NOTES
Darnell Wick is a 60 year old patient who comes in today for a Blood Pressure check.  Initial BP:  There were no vitals taken for this visit.     Data Unavailable  Disposition: BP elevated.  Triage RN notified, patient asked to wait  BP Readings from Last 3 Encounters:   07/23/20 (!) 148/76   02/15/19 132/68   03/30/18 139/78   PrabhakarCMA

## 2020-08-20 NOTE — TELEPHONE ENCOUNTER
He has started a beta blocker since his pulse was last checked, so I would like to know what it is. Should always be checked with a blood pressure check

## 2020-08-20 NOTE — TELEPHONE ENCOUNTER
I did not do one since it was a third blood pressure check.  It does not look like the MA did any either.  If you need one have your nurse's call and request he do it and give it to them over the phone.   aLuren LARIOSN, RN

## 2020-08-21 NOTE — TELEPHONE ENCOUNTER
Left message on voice mail for patient to call clinic.  Need to ask patient what is his pulse running since med started   [phone]  Krystal Paniagua RN

## 2020-08-24 NOTE — TELEPHONE ENCOUNTER
Patient is returning call regarding his blood pressure readings and no return call regarding if there are any changes in medication or not. Please call.

## 2020-08-25 RX ORDER — CARVEDILOL 25 MG/1
25 TABLET ORAL 2 TIMES DAILY WITH MEALS
Qty: 60 TABLET | Refills: 1 | Status: SHIPPED | OUTPATIENT
Start: 2020-08-25 | End: 2020-09-16

## 2020-08-25 NOTE — TELEPHONE ENCOUNTER
Spoke with patient and informed him of med change and instructions per Jayla Trevino PA-C, see below read word for word.  Patient verbalized understanding.  Per patient request did call patient back and left detailed message on voicemail with instructions below.  Will send message to MA staff to help arrange BP and PULSE check.

## 2020-08-26 ENCOUNTER — TELEPHONE (OUTPATIENT)
Dept: FAMILY MEDICINE | Facility: CLINIC | Age: 60
End: 2020-08-26

## 2020-08-26 NOTE — TELEPHONE ENCOUNTER
Left message on voice mail for patient to call clinic. [phone]    Tatum Crocker RN BSN

## 2020-08-26 NOTE — TELEPHONE ENCOUNTER
Reason for call:  Other   Patient called regarding (reason for call): call back  Additional comments: Patient is calling to get some clarification about which medications he should be taking. Please call back to discuss.      Phone number to reach patient:  Home number on file [phone] (home)    Best Time:  Any     Can we leave a detailed message on this number?  YES    Travel screening: Negative

## 2020-08-27 NOTE — TELEPHONE ENCOUNTER
Left message on voice mail for patient to call clinic. [phone]    Tatum Crocker RN BSN

## 2020-08-28 NOTE — TELEPHONE ENCOUNTER
No return call after several days. Will close encounter.  Await patient call.  Krystal Paniagua RN

## 2020-08-28 NOTE — TELEPHONE ENCOUNTER
Left message on voice mail for patient to call clinic. [phone]    Tatum Crocker RN BSN

## 2020-09-08 DIAGNOSIS — I10 HYPERTENSION, GOAL BELOW 140/90: ICD-10-CM

## 2020-09-09 RX ORDER — HYDROCHLOROTHIAZIDE 25 MG/1
TABLET ORAL
Qty: 90 TABLET | Refills: 0 | OUTPATIENT
Start: 2020-09-09

## 2020-09-09 NOTE — TELEPHONE ENCOUNTER
"Pt had dose decrease with new scirpt sent on 5/28/20            Requested Prescriptions   Pending Prescriptions Disp Refills     hydrochlorothiazide (HYDRODIURIL) 25 MG tablet [Pharmacy Med Name: HYDROCHLOROTHIAZIDE 25 MG TAB] 90 tablet 0     Sig: TAKE 1 TABLET BY MOUTH EVERY DAY       Diuretics (Including Combos) Protocol Failed - 9/8/2020  7:17 AM        Failed - Blood pressure under 140/90 in past 12 months     BP Readings from Last 3 Encounters:   08/19/20 (!) 152/72   07/23/20 (!) 148/76   02/15/19 132/68                 Failed - Normal serum sodium on file in past 12 months     Recent Labs   Lab Test 07/23/20  1442   *              Passed - Recent (12 mo) or future (30 days) visit within the authorizing provider's specialty     Patient has had an office visit with the authorizing provider or a provider within the authorizing providers department within the previous 12 mos or has a future within next 30 days. See \"Patient Info\" tab in inbasket, or \"Choose Columns\" in Meds & Orders section of the refill encounter.              Passed - Medication is active on med list        Passed - Patient is age 18 or older        Passed - Normal serum creatinine on file in past 12 months     Recent Labs   Lab Test 07/23/20  1442   CR 1.13              Passed - Normal serum potassium on file in past 12 months     Recent Labs   Lab Test 07/23/20  1442   POTASSIUM 3.7                         "

## 2020-09-11 ENCOUNTER — ALLIED HEALTH/NURSE VISIT (OUTPATIENT)
Dept: NURSING | Facility: CLINIC | Age: 60
End: 2020-09-11
Payer: COMMERCIAL

## 2020-09-11 VITALS — DIASTOLIC BLOOD PRESSURE: 70 MMHG | SYSTOLIC BLOOD PRESSURE: 132 MMHG | HEART RATE: 57 BPM

## 2020-09-11 DIAGNOSIS — Z01.30 BLOOD PRESSURE CHECK: Primary | ICD-10-CM

## 2020-09-11 PROCEDURE — 99207 ZZC DROP WITH A PROCEDURE: CPT | Mod: 25

## 2020-09-14 ENCOUNTER — TELEPHONE (OUTPATIENT)
Dept: FAMILY MEDICINE | Facility: CLINIC | Age: 60
End: 2020-09-14

## 2020-09-14 DIAGNOSIS — I10 HYPERTENSION, GOAL BELOW 140/90: ICD-10-CM

## 2020-09-14 NOTE — TELEPHONE ENCOUNTER
Please call patient with the following info:    Patient noted he's having loose stools at his blood pressure recheck. Is this new?

## 2020-09-15 NOTE — TELEPHONE ENCOUNTER
Patient was taking 12.5 mg of carvedilol (COREG)  tablet , now with the increase to 25 mg he has been having loose stools x 3 weeks. He thinks this is medication related. Please call [phone] to discuss.

## 2020-09-16 RX ORDER — CARVEDILOL 12.5 MG/1
12.5 TABLET ORAL 2 TIMES DAILY WITH MEALS
Qty: 180 TABLET | Refills: 1 | Status: SHIPPED | OUTPATIENT
Start: 2020-09-16 | End: 2021-06-21

## 2020-09-16 NOTE — TELEPHONE ENCOUNTER
Decrease dose back to 12.5mg BID, pharmacy updated. Will refer him to cardiology for further evaluation and to discuss any other med options

## 2020-09-16 NOTE — TELEPHONE ENCOUNTER
[facility] [place] [phone] (Cardiology Referral). Phone number left on patient's phone (per his request).     Prescription for carvedilol (COREG) 12.5 MG tablet 2 times daily (with meals) sent to [facility] #[id number] - [place], [place] - [address] [address] [facility] [address].     Directives from Jayla reviewed with patient and he verbalized a good understanding.     Tatum Crocker RN BSN

## 2020-09-17 DIAGNOSIS — I10 HYPERTENSION, GOAL BELOW 140/90: ICD-10-CM

## 2020-09-17 RX ORDER — CARVEDILOL 25 MG/1
TABLET ORAL
Qty: 60 TABLET | Refills: 1 | OUTPATIENT
Start: 2020-09-17

## 2020-09-22 ENCOUNTER — TELEPHONE (OUTPATIENT)
Dept: CARDIOLOGY | Facility: CLINIC | Age: 60
End: 2020-09-22

## 2020-09-22 NOTE — TELEPHONE ENCOUNTER
I10 (ICD-10-CM) - Hypertension, goal below 140/90 Cardiology referral in work que note. Left message to return clinic call to .  Kayla Gates L.P.N.

## 2021-04-05 DIAGNOSIS — M10.09 IDIOPATHIC GOUT OF MULTIPLE SITES, UNSPECIFIED CHRONICITY: ICD-10-CM

## 2021-04-05 NOTE — TELEPHONE ENCOUNTER
"Requested Prescriptions   Pending Prescriptions Disp Refills    allopurinol (ZYLOPRIM) 100 MG tablet 90 tablet 2     Sig: Take 1 tablet (100 mg) by mouth daily       Gout Agents Protocol Failed - 4/5/2021  9:55 AM        Failed - CBC on file in past 12 months     Recent Labs   Lab Test 04/11/16  1549   WBC 7.5   RBC 4.79   HGB 15.9   HCT 46.3                    Passed - ALT on file in past 12 months     Recent Labs   Lab Test 07/23/20  1442   ALT 65             Passed - Has Uric Acid on file in past 12 months and value is less than 6     Recent Labs   Lab Test 07/23/20  1442   URIC 3.4*     If level is 6mg/dL or greater, ok to refill one time and refer to provider.           Passed - Recent (12 mo) or future (30 days) visit within the authorizing provider's specialty     Patient has had an office visit with the authorizing provider or a provider within the authorizing providers department within the previous 12 mos or has a future within next 30 days. See \"Patient Info\" tab in inbasket, or \"Choose Columns\" in Meds & Orders section of the refill encounter.              Passed - Medication is active on med list        Passed - Patient is age 18 or older        Passed - Normal serum creatinine on file in the past 12 months     Recent Labs   Lab Test 07/23/20  1442   CR 1.13       Ok to refill medication if creatinine is low             Routing refill request to provider for review/approval because:  Failed protocol.  Nadiya LARIOSN-RN  [facility]    "

## 2021-04-06 RX ORDER — ALLOPURINOL 100 MG/1
100 TABLET ORAL DAILY
Qty: 90 TABLET | Refills: 0 | Status: SHIPPED | OUTPATIENT
Start: 2021-04-06 | End: 2021-07-05

## 2021-07-14 ENCOUNTER — TELEPHONE (OUTPATIENT)
Dept: FAMILY MEDICINE | Facility: CLINIC | Age: 61
End: 2021-07-14

## 2021-07-14 DIAGNOSIS — I10 HYPERTENSION, GOAL BELOW 140/90: ICD-10-CM

## 2021-07-15 NOTE — TELEPHONE ENCOUNTER
Tony Duke CMA contacted Darnell on 07/15/21 and left a message. If patient calls back please relay message from below and help make appointment.

## 2021-07-15 NOTE — TELEPHONE ENCOUNTER
Jen fill already given. Patient due for physical. Please call and assist with scheduling appointment. Once appointment is made refills can be sent in to get to his appointment.     Thank you,  Luba Lira RN

## 2021-07-16 RX ORDER — AMLODIPINE BESYLATE 10 MG/1
TABLET ORAL
Qty: 30 TABLET | Refills: 0 | Status: SHIPPED | OUTPATIENT
Start: 2021-07-16 | End: 2021-08-02

## 2021-07-16 RX ORDER — LOSARTAN POTASSIUM 100 MG/1
TABLET ORAL
Qty: 30 TABLET | Refills: 0 | Status: SHIPPED | OUTPATIENT
Start: 2021-07-16 | End: 2021-08-03

## 2021-07-16 RX ORDER — CARVEDILOL 12.5 MG/1
TABLET ORAL
Qty: 60 TABLET | Refills: 0 | Status: SHIPPED | OUTPATIENT
Start: 2021-07-16 | End: 2021-08-03

## 2021-07-16 RX ORDER — HYDROCHLOROTHIAZIDE 25 MG/1
TABLET ORAL
Qty: 30 TABLET | Refills: 0 | Status: SHIPPED | OUTPATIENT
Start: 2021-07-16 | End: 2021-08-03

## 2021-07-16 NOTE — TELEPHONE ENCOUNTER
"Chart says patient \"prefers home number\".   I called [phone] home number, adult female says patient is at work, he is usually home by 3:15.  She does not think he'd hear his cell phone if we called that now.    Will try back after 3:15 pm.    Re-flagged for call.    Sumaya Cates RN  [facility]      "

## 2021-07-16 NOTE — TELEPHONE ENCOUNTER
I called and spoke to Bryn, scheduled annual visit for 8/9 in the afternoon (after work).    Sent another one month refill of all meds.    Patient says he's been having diarrhea (small squirts/liquid), 4-5 times a day for the past 5 days.   Got his [employer] covid shot last Saturday so wonders if due to that?   Denies fever, gets cramps that are relieved by stooling.   No emesis or blood in stool.    He stopped his coreg to see if that helps, I advised he not do that, he does not check BP at home.    Advised he rest, stay hydrated, bland diet this weekend and update us on Monday to see if he's improved or needs to be seen.    Will route to RN box for follow up on Monday.    Sumaya Cates RN  [facility]

## 2021-07-19 DIAGNOSIS — M10.09 IDIOPATHIC GOUT OF MULTIPLE SITES, UNSPECIFIED CHRONICITY: ICD-10-CM

## 2021-07-19 RX ORDER — ALLOPURINOL 100 MG/1
100 TABLET ORAL DAILY
Qty: 30 TABLET | Refills: 0 | Status: SHIPPED | OUTPATIENT
Start: 2021-07-19 | End: 2021-08-09

## 2021-07-19 NOTE — TELEPHONE ENCOUNTER
"Requested Prescriptions   Pending Prescriptions Disp Refills    allopurinol (ZYLOPRIM) 100 MG tablet [Pharmacy Med Name: ALLOPURINOL 100 MG TABLET] 30 tablet 0     Sig: Take 1 tablet (100 mg) by mouth daily - DUE FOR YEARLY VISIT        Gout Agents Protocol Failed - 7/19/2021  5:06 PM        Failed - CBC on file in past 12 months     Recent Labs   Lab Test 04/11/16  1549   WBC 7.5   RBC 4.79   HGB 15.9   HCT 46.3                    Passed - ALT on file in past 12 months     Recent Labs   Lab Test 07/23/20  1442   ALT 65             Passed - Has Uric Acid on file in past 12 months and value is less than 6     Recent Labs   Lab Test 07/23/20  1442   URIC 3.4*     If level is 6mg/dL or greater, ok to refill one time and refer to provider.           Passed - Recent (12 mo) or future (30 days) visit within the authorizing provider's specialty     Patient has had an office visit with the authorizing provider or a provider within the authorizing providers department within the previous 12 mos or has a future within next 30 days. See \"Patient Info\" tab in inbasket, or \"Choose Columns\" in Meds & Orders section of the refill encounter.              Passed - Medication is active on med list        Passed - Patient is age 18 or older        Passed - Normal serum creatinine on file in the past 12 months     Recent Labs   Lab Test 07/23/20  1442   CR 1.13       Ok to refill medication if creatinine is low              Routing refill request to provider for review/approval because:  Failed protocol.  Nadiya Gold BSN-RN  [facility]    "

## 2021-07-19 NOTE — TELEPHONE ENCOUNTER
See previous notes, need to call after 3:15 pm (I forgot and called, wife reminded me of that).    Will call later.    Sumaya Cates RN  [facility]

## 2021-07-19 NOTE — TELEPHONE ENCOUNTER
"I called and spoke to patient; he says he did \"just like you said\" and he is feeling perfectly fine today, no more cramping or diarrhea.    He wonders about his refills, I advised I sent 3 of them Friday, the allopurinol was sent by PCP on 7/5.    He says he will go to pharmacy now to get these and will call if any problems.    Sumaya Cates RN  [facility]      "

## 2021-08-02 DIAGNOSIS — I10 HYPERTENSION, GOAL BELOW 140/90: ICD-10-CM

## 2021-08-03 NOTE — TELEPHONE ENCOUNTER
Routing refill request to provider for review/approval because:  Labs not current:  Cr, K+ na  Patient needs to be seen because it has been more than 1 year since last office visit.    Pt has appt with pcp on 8/9/21    Medication pended for approval, 30 day supply with reminder.

## 2021-08-05 RX ORDER — CARVEDILOL 12.5 MG/1
TABLET ORAL
Qty: 60 TABLET | Refills: 0 | Status: SHIPPED | OUTPATIENT
Start: 2021-08-05 | End: 2021-08-09

## 2021-08-05 RX ORDER — HYDROCHLOROTHIAZIDE 25 MG/1
25 TABLET ORAL DAILY
Qty: 30 TABLET | Refills: 0 | Status: SHIPPED | OUTPATIENT
Start: 2021-08-05 | End: 2021-08-09

## 2021-08-05 RX ORDER — LOSARTAN POTASSIUM 100 MG/1
100 TABLET ORAL DAILY
Qty: 30 TABLET | Refills: 0 | Status: SHIPPED | OUTPATIENT
Start: 2021-08-05 | End: 2021-08-09

## 2021-08-05 RX ORDER — AMLODIPINE BESYLATE 10 MG/1
10 TABLET ORAL DAILY
Qty: 30 TABLET | Refills: 0 | Status: SHIPPED | OUTPATIENT
Start: 2021-08-05 | End: 2021-08-09

## 2021-08-09 ENCOUNTER — OFFICE VISIT (OUTPATIENT)
Dept: FAMILY MEDICINE | Facility: CLINIC | Age: 61
End: 2021-08-09
Payer: COMMERCIAL

## 2021-08-09 VITALS
TEMPERATURE: 99.3 F | HEART RATE: 75 BPM | OXYGEN SATURATION: 98 % | DIASTOLIC BLOOD PRESSURE: 72 MMHG | RESPIRATION RATE: 22 BRPM | BODY MASS INDEX: 30.49 KG/M2 | SYSTOLIC BLOOD PRESSURE: 134 MMHG | WEIGHT: 201.2 LBS | HEIGHT: 68 IN

## 2021-08-09 DIAGNOSIS — H10.13 ALLERGIC CONJUNCTIVITIS, BILATERAL: ICD-10-CM

## 2021-08-09 DIAGNOSIS — Z00.01 ENCOUNTER FOR ROUTINE ADULT MEDICAL EXAM WITH ABNORMAL FINDINGS: Primary | ICD-10-CM

## 2021-08-09 DIAGNOSIS — I10 HYPERTENSION, GOAL BELOW 140/90: ICD-10-CM

## 2021-08-09 DIAGNOSIS — Z12.11 COLON CANCER SCREENING: ICD-10-CM

## 2021-08-09 DIAGNOSIS — M10.09 IDIOPATHIC GOUT OF MULTIPLE SITES, UNSPECIFIED CHRONICITY: ICD-10-CM

## 2021-08-09 DIAGNOSIS — Z91.89 CARDIOVASCULAR RISK FACTOR: ICD-10-CM

## 2021-08-09 DIAGNOSIS — Z12.5 SCREENING FOR PROSTATE CANCER: ICD-10-CM

## 2021-08-09 LAB
ALBUMIN SERPL-MCNC: 4.4 G/DL (ref 3.4–5)
ALP SERPL-CCNC: 83 U/L (ref 40–150)
ALT SERPL W P-5'-P-CCNC: 83 U/L (ref 0–70)
ANION GAP SERPL CALCULATED.3IONS-SCNC: 10 MMOL/L (ref 3–14)
AST SERPL W P-5'-P-CCNC: 58 U/L (ref 0–45)
BILIRUB SERPL-MCNC: 1.4 MG/DL (ref 0.2–1.3)
BUN SERPL-MCNC: 21 MG/DL (ref 7–30)
CALCIUM SERPL-MCNC: 9.1 MG/DL (ref 8.5–10.1)
CHLORIDE BLD-SCNC: 96 MMOL/L (ref 94–109)
CO2 SERPL-SCNC: 24 MMOL/L (ref 20–32)
CREAT SERPL-MCNC: 1.72 MG/DL (ref 0.66–1.25)
GFR SERPL CREATININE-BSD FRML MDRD: 43 ML/MIN/1.73M2
GLUCOSE BLD-MCNC: 88 MG/DL (ref 70–99)
POTASSIUM BLD-SCNC: 4 MMOL/L (ref 3.4–5.3)
PROT SERPL-MCNC: 8.3 G/DL (ref 6.8–8.8)
PSA SERPL-MCNC: 4.82 UG/L (ref 0–4)
SODIUM SERPL-SCNC: 130 MMOL/L (ref 133–144)
URATE SERPL-MCNC: 4 MG/DL (ref 3.5–7.2)

## 2021-08-09 PROCEDURE — 36415 COLL VENOUS BLD VENIPUNCTURE: CPT | Performed by: PHYSICIAN ASSISTANT

## 2021-08-09 PROCEDURE — G0103 PSA SCREENING: HCPCS | Performed by: PHYSICIAN ASSISTANT

## 2021-08-09 PROCEDURE — 99214 OFFICE O/P EST MOD 30 MIN: CPT | Mod: 25 | Performed by: PHYSICIAN ASSISTANT

## 2021-08-09 PROCEDURE — 84550 ASSAY OF BLOOD/URIC ACID: CPT | Performed by: PHYSICIAN ASSISTANT

## 2021-08-09 PROCEDURE — 80053 COMPREHEN METABOLIC PANEL: CPT | Performed by: PHYSICIAN ASSISTANT

## 2021-08-09 PROCEDURE — 99396 PREV VISIT EST AGE 40-64: CPT | Performed by: PHYSICIAN ASSISTANT

## 2021-08-09 RX ORDER — CARVEDILOL 12.5 MG/1
TABLET ORAL
Qty: 180 TABLET | Refills: 3 | Status: SHIPPED | OUTPATIENT
Start: 2021-08-09 | End: 2021-08-13

## 2021-08-09 RX ORDER — AMLODIPINE BESYLATE 10 MG/1
10 TABLET ORAL DAILY
Qty: 90 TABLET | Refills: 3 | Status: SHIPPED | OUTPATIENT
Start: 2021-08-09 | End: 2022-09-07

## 2021-08-09 RX ORDER — ALLOPURINOL 100 MG/1
100 TABLET ORAL DAILY
Qty: 90 TABLET | Refills: 3 | Status: SHIPPED | OUTPATIENT
Start: 2021-08-09 | End: 2022-09-07

## 2021-08-09 RX ORDER — HYDROCHLOROTHIAZIDE 25 MG/1
25 TABLET ORAL DAILY
Qty: 90 TABLET | Refills: 3 | Status: SHIPPED | OUTPATIENT
Start: 2021-08-09 | End: 2021-08-13

## 2021-08-09 RX ORDER — OLOPATADINE HYDROCHLORIDE 1 MG/ML
1 SOLUTION/ DROPS OPHTHALMIC 2 TIMES DAILY
Qty: 5 ML | Refills: 5 | Status: SHIPPED | OUTPATIENT
Start: 2021-08-09

## 2021-08-09 RX ORDER — LOSARTAN POTASSIUM 100 MG/1
100 TABLET ORAL DAILY
Qty: 90 TABLET | Refills: 3 | Status: SHIPPED | OUTPATIENT
Start: 2021-08-09 | End: 2022-09-07

## 2021-08-09 ASSESSMENT — MIFFLIN-ST. JEOR: SCORE: 1694.52

## 2021-08-09 NOTE — PROGRESS NOTES
SUBJECTIVE:   CC: Darnell Wick is an 61 year old male who presents for preventative health visit.     Patient has been advised of split billing requirements and indicates understanding: Yes  Healthy Habits:    Getting at least 3 servings of Calcium per day:  NO    Bi-annual eye exam:  NO    Dental care twice a year:  NO    Sleep apnea or symptoms of sleep apnea:  None    Frequency of exercise:  None    Duration of exercise:  N/A    Taking medications regularly:  Yes    Barriers to taking medications:  Not applicable    Medication side effects:  Not applicable    PHQ-2 Total Score:    Additional concerns today:  No    The 10-year ASCVD risk score (Gayathri BARBA Jr., et al., 2013) is: 8.9%    Values used to calculate the score:      Age: 61 years      Sex: Male      Is Non- : No      Diabetic: No      Tobacco smoker: No      Systolic Blood Pressure: 134 mmHg      Is BP treated: Yes      HDL Cholesterol: 73 mg/dL      Total Cholesterol: 237 mg/dL     Medication renewal     Hypertension Follow-up      Do you check your blood pressure regularly outside of the clinic? No     Are you following a low salt diet? Yes    Are your blood pressures ever more than 140 on the top number (systolic) OR more   than 90 on the bottom number (diastolic), for example 140/90? na    Also needs allopurinol renewed  Would like something for allergy sxs of the eyes - scratchy, red    Today's PHQ-2 Score:   PHQ-2 ( 1999 Pfizer) 8/9/2021   Q1: Little interest or pleasure in doing things 0   Q2: Feeling down, depressed or hopeless 0   PHQ-2 Score 0       Abuse: Current or Past(Physical, Sexual or Emotional)- No  Do you feel safe in your environment? Yes        Social History     Tobacco Use     Smoking status: Never Smoker     Smokeless tobacco: Never Used   Substance Use Topics     Alcohol use: Yes     Comment: Occasional, less than 1 drink per month     If you drink alcohol do you typically have >3 drinks per day or >7  "drinks per week? No    No flowsheet data found.    Last PSA:   PSA   Date Value Ref Range Status   05/27/2020 4.76 (H) 0 - 4 ug/L Final     Comment:     Assay Method:  Chemiluminescence using Siemens Vista analyzer       Reviewed orders with patient. Reviewed health maintenance and updated orders accordingly - Yes  Lab work is in process    Reviewed and updated as needed this visit by clinical staff  Tobacco  Allergies  Meds              Reviewed and updated as needed this visit by Provider                Past Medical History:   Diagnosis Date     Essential hypertension 2/8/2016        Review of Systems  Other than what is noted in the HPI and PMH a complete review of systems is otherwise negative including: Constitutional, HEENT, endocrine, cardiovascular, respiratory, GI/, musculoskeletal, neuro, and psychiatric.     OBJECTIVE:   /72   Pulse 75   Temp 99.3  F (37.4  C) (Tympanic)   Resp 22   Ht 1.715 m (5' 7.52\")   Wt 91.3 kg (201 lb 3.2 oz)   SpO2 98%   BMI 31.03 kg/m      Physical Exam  GENERAL: healthy, alert and no distress  EYES: Eyes grossly normal to inspection and conjunctiva/corneas- conjunctival injection bilat  HENT: ear canals and TM's normal, nose and mouth without ulcers or lesions  NECK: no adenopathy, no asymmetry, masses, or scars and thyroid normal to palpation  RESP: lungs clear to auscultation - no rales, rhonchi or wheezes  CV: regular rates and rhythm, normal S1 S2, no S3 or S4, no murmur, click or rub and no bruits heard   ABDOMEN: soft, nontender, no hepatosplenomegaly, no masses and bowel sounds normal  MS: no gross musculoskeletal defects noted, no edema  SKIN: no suspicious lesions or rashes  NEURO: Normal strength and tone, mentation intact and speech normal  PSYCH: mentation appears normal, affect normal/bright    ASSESSMENT/PLAN:       ICD-10-CM    1. Encounter for routine adult medical exam with abnormal findings  Z00.01    2. Colon cancer screening  Z12.11 Fecal " "colorectal cancer screen (FIT)   3. Screening for prostate cancer  Z12.5 PROSTATE SPEC ANTIGEN SCREEN     PROSTATE SPEC ANTIGEN SCREEN   4. Cardiovascular risk factor  Z91.89 CT Coronary Calcium Scan   5. Idiopathic gout of multiple sites, unspecified chronicity  M10.09 COMPREHENSIVE METABOLIC PANEL     allopurinol (ZYLOPRIM) 100 MG tablet     Uric acid     COMPREHENSIVE METABOLIC PANEL     Uric acid   6. Hypertension, goal below 140/90  I10 Albumin Random Urine Quantitative with Creat Ratio     COMPREHENSIVE METABOLIC PANEL     amLODIPine (NORVASC) 10 MG tablet     carvedilol (COREG) 12.5 MG tablet     hydrochlorothiazide (HYDRODIURIL) 25 MG tablet     losartan (COZAAR) 100 MG tablet     COMPREHENSIVE METABOLIC PANEL     Albumin Random Urine Quantitative with Creat Ratio     CANCELED: Albumin Random Urine Quantitative with Creat Ratio   7. Allergic conjunctivitis, bilateral  H10.13 olopatadine (PATANOL) 0.1 % ophthalmic solution         1-4) Screenings discussed    5,6) Routine lab obtained today. Repeat blood pressure at goal. Meds renewed, no changes.     7) Will trial Patanol for allergy sxs of the eyes.       Patient has been advised of split billing requirements and indicates understanding: Yes  COUNSELING:   Reviewed preventive health counseling, as reflected in patient instructions    Estimated body mass index is 31.03 kg/m  as calculated from the following:    Height as of this encounter: 1.715 m (5' 7.52\").    Weight as of this encounter: 91.3 kg (201 lb 3.2 oz).     He reports that he has never smoked. He has never used smokeless tobacco.      Counseling Resources:  ATP IV Guidelines  Pooled Cohorts Equation Calculator  FRAX Risk Assessment  ICSI Preventive Guidelines  Dietary Guidelines for Americans, 2010  USDA's MyPlate  ASA Prophylaxis  Lung CA Screening    NIC [facility]  "

## 2021-08-09 NOTE — PROGRESS NOTES
SUBJECTIVE:   CC: Darnell Wick is an 61 year old male who presents for preventative health visit.     {Split Bill scripting  The purpose of this visit is to discuss your medical history and prevent health problems before you are sick. You may be responsible for a co-pay, coinsurance, or deductible if your visit today includes services such as checking on a sore throat, having an x-ray or lab test, or treating and evaluating a new or existing condition :985813}  Patient has been advised of split billing requirements and indicates understanding: {Yes and No:959735}  HPI  {Add if <65 person on Medicare  - Required Questions (Optional):360455}  {Outside tests to abstract? :838690}    {additional problems to add (Optional):047792}    Today's PHQ-2 Score:   PHQ-2 ( 1999 Pfizer) 5/5/2020   Q1: Little interest or pleasure in doing things 0   Q2: Feeling down, depressed or hopeless 0   PHQ-2 Score 0       Abuse: Current or Past(Physical, Sexual or Emotional)- { :197167}  Do you feel safe in your environment? { :805358}        Social History     Tobacco Use     Smoking status: Never Smoker     Smokeless tobacco: Never Used   Substance Use Topics     Alcohol use: Yes     Comment: Occasional, less than 1 drink per month     {Rooming Staff- Complete this question if Prescreen response is not shown below for today's visit. If you drink alcohol do you typically have >3 drinks per day or >7 drinks per week? (Optional):583883}    No flowsheet data found.{add AUDIT responses (Optional) (A score of 7 for adult men is an indication of hazardous drinking; a score of 8 or more is an indication of an alcohol use disorder.  A score of 7 or more for adult women is an indication of hazardous drinking or an alchohol use disorder):731033}    Last PSA:   PSA   Date Value Ref Range Status   05/27/2020 4.76 (H) 0 - 4 ug/L Final     Comment:     Assay Method:  Chemiluminescence using Siemens Vista analyzer       Reviewed orders with patient.  "Reviewed health maintenance and updated orders accordingly - { :693627::\"Yes\"}  {Chronicprobdata (optional):370549}    Reviewed and updated as needed this visit by clinical staff                 Reviewed and updated as needed this visit by Provider                {HISTORY OPTIONS (Optional):345357}    Review of Systems  {MALE ROS (Optional):741257::\"CONSTITUTIONAL: NEGATIVE for fever, chills, change in weight\",\"INTEGUMENTARY/SKIN: NEGATIVE for worrisome rashes, moles or lesions\",\"EYES: NEGATIVE for vision changes or irritation\",\"ENT: NEGATIVE for ear, mouth and throat problems\",\"RESP: NEGATIVE for significant cough or SOB\",\"CV: NEGATIVE for chest pain, palpitations or peripheral edema\",\"GI: NEGATIVE for nausea, abdominal pain, heartburn, or change in bowel habits\",\" male: negative for dysuria, hematuria, decreased urinary stream, erectile dysfunction, urethral discharge\",\"MUSCULOSKELETAL: NEGATIVE for significant arthralgias or myalgia\",\"NEURO: NEGATIVE for weakness, dizziness or paresthesias\",\"PSYCHIATRIC: NEGATIVE for changes in mood or affect\"}    OBJECTIVE:   There were no vitals taken for this visit.    Physical Exam  {Exam Choices (Optional):470916}    {Diagnostic Test Results (Optional):346844::\"Diagnostic Test Results:\",\"Labs reviewed in Epic\"}    ASSESSMENT/PLAN:   {Diag Picklist:791237}    Patient has been advised of split billing requirements and indicates understanding: {YES / NO:018051::\"Yes\"}  COUNSELING:   {MALE COUNSELING MESSAGES:709412::\"Reviewed preventive health counseling, as reflected in patient instructions\"}    Estimated body mass index is 30.31 kg/m  as calculated from the following:    Height as of 7/23/20: 1.727 m (5' 8\").    Weight as of 7/23/20: 90.4 kg (199 lb 6 oz).     {Weight Management Plan (ACO) Complete if BMI is abnormal-  Ages 18-64  BMI >24.9.  Age 65+ with BMI <23 or >30 (Optional):177834}    He reports that he has never smoked. He has never used smokeless " tobacco.      Counseling Resources:  ATP IV Guidelines  Pooled Cohorts Equation Calculator  FRAX Risk Assessment  ICSI Preventive Guidelines  Dietary Guidelines for Americans, 2010  USDA's MyPlate  ASA Prophylaxis  Lung CA Screening    NIC [facility]

## 2021-08-13 ENCOUNTER — TELEPHONE (OUTPATIENT)
Dept: FAMILY MEDICINE | Facility: CLINIC | Age: 61
End: 2021-08-13

## 2021-08-13 DIAGNOSIS — R97.20 ELEVATED PROSTATE SPECIFIC ANTIGEN (PSA): ICD-10-CM

## 2021-08-13 DIAGNOSIS — I10 HYPERTENSION, GOAL BELOW 140/90: ICD-10-CM

## 2021-08-13 DIAGNOSIS — E87.1 HYPONATREMIA: Primary | ICD-10-CM

## 2021-08-13 DIAGNOSIS — R79.89 ELEVATED LFTS: ICD-10-CM

## 2021-08-13 RX ORDER — CARVEDILOL 25 MG/1
25 TABLET ORAL 2 TIMES DAILY WITH MEALS
Qty: 180 TABLET | Refills: 2 | Status: SHIPPED | OUTPATIENT
Start: 2021-08-13 | End: 2023-10-24

## 2021-08-13 RX ORDER — HYDROCHLOROTHIAZIDE 12.5 MG/1
12.5 TABLET ORAL DAILY
Qty: 90 TABLET | Refills: 2 | Status: SHIPPED | OUTPATIENT
Start: 2021-08-13 | End: 2023-10-24

## 2021-08-13 NOTE — TELEPHONE ENCOUNTER
Called [phone] (home)     Did patient answer the phone: No, left a message on voicemail to return call to the [facility] at [phone].    Nadiya RN BSN  Triage Nurse  [facility]: [facility]

## 2021-08-13 NOTE — LETTER
August 18, 2021      Darnell Wick  [address] [place] [id number]        Dear ,    We are writing to inform you of your test results and have been trying to reach you by phone.    Per Jayla Trevino:     Your Uric acid and glucose are normal.     Kidney function is decreased some and your sodium level is low. I'd like to change your blood pressure medication dosages due to this: decrease hydrochlorothiazide to 12.5mg daily (half tab). Increase carvedilol to 25mg BID (2 tabs BID). Please recheck your BMP and blood pressure in 2-3 weeks, an ancillary/lab appointment is ok.    Your liver enzymes are mildly elevated and moreso than last summer. Alcohol... how much, how often?    Prostate marker is elevated, but stable compared to last year. Most likely related to prostate enlargement, but can not exclude prostate cancer. I recommend seeing a urologist for an eval and to determine if a biopsy is needed. A urology referral is enclosed.       Resulted Orders   COMPREHENSIVE METABOLIC PANEL   Result Value Ref Range    Sodium 130 (L) 133 - 144 mmol/L    Potassium 4.0 3.4 - 5.3 mmol/L    Chloride 96 94 - 109 mmol/L    Carbon Dioxide (CO2) 24 20 - 32 mmol/L    Anion Gap 10 3 - 14 mmol/L    Urea Nitrogen 21 7 - 30 mg/dL    Creatinine 1.72 (H) 0.66 - 1.25 mg/dL    Calcium 9.1 8.5 - 10.1 mg/dL    Glucose 88 70 - 99 mg/dL    Alkaline Phosphatase 83 40 - 150 U/L    AST 58 (H) 0 - 45 U/L    ALT 83 (H) 0 - 70 U/L    Protein Total 8.3 6.8 - 8.8 g/dL    Albumin 4.4 3.4 - 5.0 g/dL    Bilirubin Total 1.4 (H) 0.2 - 1.3 mg/dL    GFR Estimate 43 (L) >60 mL/min/1.73m2      Comment:      As of July 11, 2021, eGFR is calculated by the CKD-EPI creatinine equation, without race adjustment. eGFR can be influenced by muscle mass, exercise, and diet. The reported eGFR is an estimation only and is only applicable if the renal function is stable.   PROSTATE SPEC ANTIGEN SCREEN   Result Value Ref Range    Prostate Specific  Antigen Screen 4.82 (H) 0.00 - 4.00 ug/L   Uric acid   Result Value Ref Range    Uric Acid 4.0 3.5 - 7.2 mg/dL       If you have any questions or concerns, please call the clinic at the number listed above.       Sincerely,    Jayla Trevino PA-C/jordon

## 2021-08-13 NOTE — TELEPHONE ENCOUNTER
Please call patient with the following info:    Uric acid and glucose are normal    Kidney function is decreased some and his sodium level is low. I'd like to change his blood pressure medication dosages due to this: decrease hydrochlorothiazide to 12.5mg daily (half tab). Increase carvedilol to 25mg BID (2 tabs BID). Recheck BMP and blood pressure in 2-3 weeks, ancillary/lab ok    Liver enzymes are mildly elevated and moreso than last summer. Alcohol... how much, how often?    Prostate marker is elevated, but stable compared to last year. Most likely related to prostate enlargement, but can not exclude prostate cancer. I recommend seeing a urologist for an eval and to determine if a bx is needed

## 2021-08-17 NOTE — TELEPHONE ENCOUNTER
Left message on mobile voice mail and left message with mom at home number for patient to call clinic.   [phone]  Krystal Paniagua RN  [facility]

## 2021-08-18 NOTE — TELEPHONE ENCOUNTER
No return call, please send letter with below results and instructions and for patient to call clinic  Krystal Paniagua RN  [facility]

## 2021-08-26 ENCOUNTER — LAB (OUTPATIENT)
Dept: LAB | Facility: CLINIC | Age: 61
End: 2021-08-26
Payer: COMMERCIAL

## 2021-08-26 DIAGNOSIS — I10 HYPERTENSION, GOAL BELOW 140/90: ICD-10-CM

## 2021-08-26 PROCEDURE — 82043 UR ALBUMIN QUANTITATIVE: CPT

## 2021-08-27 LAB
CREAT UR-MCNC: 200 MG/DL
MICROALBUMIN UR-MCNC: 23 MG/L
MICROALBUMIN/CREAT UR: 11.5 MG/G CR (ref 0–17)

## 2021-09-01 DIAGNOSIS — H10.13 ALLERGIC CONJUNCTIVITIS, BILATERAL: ICD-10-CM

## 2021-09-03 RX ORDER — OLOPATADINE HYDROCHLORIDE 1 MG/ML
SOLUTION/ DROPS OPHTHALMIC
Qty: 5 ML | Refills: 5 | OUTPATIENT
Start: 2021-09-03

## 2021-09-03 NOTE — TELEPHONE ENCOUNTER
"Patanol eye drops requested by pharmacy; I see 5 ml with 5 refills sent 8/9/21 to pharmacy by PCP.   Did they get that?  Out already?    I called [facility].    They have refills available and will fill.    \"Refusal\" routed back to pharmacy with note.    Sumaya Cates RN  [facility]            "

## 2021-10-05 ENCOUNTER — OFFICE VISIT (OUTPATIENT)
Dept: UROLOGY | Facility: CLINIC | Age: 61
End: 2021-10-05
Attending: PHYSICIAN ASSISTANT
Payer: COMMERCIAL

## 2021-10-05 VITALS — SYSTOLIC BLOOD PRESSURE: 136 MMHG | OXYGEN SATURATION: 98 % | HEART RATE: 78 BPM | DIASTOLIC BLOOD PRESSURE: 72 MMHG

## 2021-10-05 DIAGNOSIS — R97.20 ELEVATED PROSTATE SPECIFIC ANTIGEN (PSA): ICD-10-CM

## 2021-10-05 PROCEDURE — 99243 OFF/OP CNSLTJ NEW/EST LOW 30: CPT | Performed by: UROLOGY

## 2021-10-05 RX ORDER — LEVOFLOXACIN 500 MG/1
500 TABLET, FILM COATED ORAL DAILY
Qty: 3 TABLET | Refills: 0 | Status: SHIPPED | OUTPATIENT
Start: 2021-10-05 | End: 2022-07-06

## 2021-10-05 ASSESSMENT — PAIN SCALES - GENERAL: PAINLEVEL: NO PAIN (0)

## 2021-10-05 NOTE — PROGRESS NOTES
S: Darnell Wick is a pleasant  61 year old male who was requested to be seen by  Jayla Trevino for a consult with regard to patient's elevated PSA.  His recent PSA was found to be   PSA   Date Value Ref Range Status   05/27/2020 4.76 (H) 0 - 4 ug/L Final     Comment:     Assay Method:  Chemiluminescence using Siemens Vista analyzer     Prostate Specific Antigen Screen   Date Value Ref Range Status   08/09/2021 4.82 (H) 0.00 - 4.00 ug/L Final   .  His previous PSA was abnormal for the last several years.  Patient complains of minimal LUTs.  He has history of elevated PSA.  His AUA Symptom Score:  low.  Current Outpatient Medications   Medication Sig Dispense Refill     allopurinol (ZYLOPRIM) 100 MG tablet Take 1 tablet (100 mg) by mouth daily 90 tablet 3     amLODIPine (NORVASC) 10 MG tablet Take 1 tablet (10 mg) by mouth daily 90 tablet 3     carvedilol (COREG) 25 MG tablet Take 1 tablet (25 mg) by mouth 2 times daily (with meals) 180 tablet 2     hydrochlorothiazide (HYDRODIURIL) 12.5 MG tablet Take 1 tablet (12.5 mg) by mouth daily 90 tablet 2     levofloxacin (LEVAQUIN) 500 MG tablet Take 1 tablet (500 mg) by mouth daily Please start day of biopsy 3 tablet 0     losartan (COZAAR) 100 MG tablet Take 1 tablet (100 mg) by mouth daily 90 tablet 3     olopatadine (PATANOL) 0.1 % ophthalmic solution Place 1 drop into both eyes 2 times daily 5 mL 5      No Known Allergies   Past Medical History:   Diagnosis Date     Essential hypertension 2/8/2016     Past Surgical History:   Procedure Laterality Date     ORTHOPEDIC SURGERY      left middle finger      No family history on file.  He does not have a family history of prostate cancer.  Social History     Socioeconomic History     Marital status: Single     Spouse name: Not on file     Number of children: Not on file     Years of education: Not on file     Highest education level: Not on file   Occupational History     Not on file   Tobacco Use     Smoking  status: Never Smoker     Smokeless tobacco: Never Used   Substance and Sexual Activity     Alcohol use: Yes     Comment: Occasional, less than 1 drink per month     Drug use: No     Sexual activity: Yes     Partners: Female   Other Topics Concern     Parent/sibling w/ CABG, MI or angioplasty before 65F 55M? Not Asked   Social History Narrative     Not on file     Social Determinants of Health     Financial Resource Strain:      Difficulty of Paying Living Expenses:    Food Insecurity:      Worried About Running Out of Food in the Last Year:      Ran Out of Food in the Last Year:    Transportation Needs:      Lack of Transportation (Medical):      Lack of Transportation (Non-Medical):    Physical Activity:      Days of Exercise per Week:      Minutes of Exercise per Session:    Stress:      Feeling of Stress :    Social Connections:      Frequency of Communication with Friends and Family:      Frequency of Social Gatherings with Friends and Family:      Attends Synagogue Services:      Active Member of Clubs or Organizations:      Attends Club or Organization Meetings:      Marital Status:    Intimate Partner Violence:      Fear of Current or Ex-Partner:      Emotionally Abused:      Physically Abused:      Sexually Abused:         REVIEW OF SYSTEMS  =================  C: NEGATIVE for fever, chills, change in weight  I: NEGATIVE for worrisome rashes, moles or lesions  E/M: NEGATIVE for ear, mouth and throat problems  R: NEGATIVE for significant cough or SHORTNESS OF BREATH  CV:  NEGATIVE for chest pain, palpitations or peripheral edema  GI: NEGATIVE for nausea, abdominal pain, heartburn, or change in bowel habits  NEURO: NEGATIVE  PSYCH: NEGATIVE    Physical Exam:  /72 (BP Location: Right arm, Patient Position: Sitting, Cuff Size: Adult Large)   Pulse 78   SpO2 98%    Patient is pleasant, in no acute distress, good general condition.  Lung: no evidence of respiratory distress    Abdomen: Soft, nondistended,  non tender. No masses. No rebound or guarding.   Exam: penis no discharge. Testis no masses.  No scrotal skin lesion.  Prostate 40 gm smooth no nodule.  Skin: Warm and dry.  No redness.  Musculaskeletal: moving all extremities.  No weakness.  Neuro non focal  Psych normal mood and affect    Assessment/Plan:   (R97.20) Elevated prostate specific antigen (PSA)  Comment: discussed psa elevation/prostate cancer  Plan: US Guided Needle Placement, levofloxacin         (LEVAQUIN) 500 MG tablet        Schedule for biopsy          Risks of bleeding/infection discussed.

## 2021-11-09 ENCOUNTER — ANCILLARY PROCEDURE (OUTPATIENT)
Dept: ULTRASOUND IMAGING | Facility: CLINIC | Age: 61
End: 2021-11-09
Payer: COMMERCIAL

## 2021-11-09 ENCOUNTER — OFFICE VISIT (OUTPATIENT)
Dept: UROLOGY | Facility: CLINIC | Age: 61
End: 2021-11-09
Payer: COMMERCIAL

## 2021-11-09 DIAGNOSIS — R97.20 ELEVATED PROSTATE SPECIFIC ANTIGEN (PSA): Primary | ICD-10-CM

## 2021-11-09 DIAGNOSIS — R97.20 ELEVATED PROSTATE SPECIFIC ANTIGEN (PSA): ICD-10-CM

## 2021-11-09 PROCEDURE — 76942 ECHO GUIDE FOR BIOPSY: CPT | Performed by: UROLOGY

## 2021-11-09 PROCEDURE — 96372 THER/PROPH/DIAG INJ SC/IM: CPT | Mod: 59 | Performed by: UROLOGY

## 2021-11-09 PROCEDURE — 2894A SURGICAL PATHOLOGY EXAM: CPT | Performed by: PATHOLOGY

## 2021-11-09 PROCEDURE — 88342 IMHCHEM/IMCYTCHM 1ST ANTB: CPT | Performed by: PATHOLOGY

## 2021-11-09 PROCEDURE — 88341 IMHCHEM/IMCYTCHM EA ADD ANTB: CPT | Performed by: PATHOLOGY

## 2021-11-09 PROCEDURE — 55700 PR BIOPSY OF PROSTATE,NEEDLE/PUNCH: CPT | Performed by: UROLOGY

## 2021-11-09 PROCEDURE — 88305 TISSUE EXAM BY PATHOLOGIST: CPT | Performed by: PATHOLOGY

## 2021-11-09 RX ORDER — GENTAMICIN 40 MG/ML
80 INJECTION, SOLUTION INTRAMUSCULAR; INTRAVENOUS ONCE
Status: COMPLETED | OUTPATIENT
Start: 2021-11-09 | End: 2021-11-09

## 2021-11-09 RX ADMIN — GENTAMICIN 80 MG: 40 INJECTION, SOLUTION INTRAMUSCULAR; INTRAVENOUS at 14:25

## 2021-11-09 NOTE — PROGRESS NOTES
Patient is here for prostate biopsy.  He was placed in the dorsal lithotomy position.  Prostate ultrasound placed transrectally.  The neurovascular bundle was anesthetized using 1% lidocaine.  Prostate measurements obtained.  Prostate size is 32 ml.  12 biopsies obtained under guidance of prostate ultrasound using biopsy needle.  Patient tolerated procedure.  Return to clinic in one week for biopsy result.

## 2021-11-09 NOTE — PROGRESS NOTES
Clinic Administered Medication Documentation    Administrations This Visit     gentamicin (GARAMYCIN) injection 80 mg     Admin Date  11/09/2021 Action  New Bag Dose  80 mg Route  Intramuscular Site   Administered By  Maricruz Santamaria RN    Ordering Provider: Paresh Schwartz MD    Patient Supplied?: No                  Injectable Medication Documentation    Patient was given Gentamycin. Prior to medication administration, verified patients identity using patient s name and date of birth. Please see MAR and medication order for additional information. Patient instructed to remain in clinic for 15 minutes.      Was entire vial of medication used? Yes  Vial/Syringe: Single dose vial  Expiration Date:  10/22  Was this medication supplied by the patient? No  The following medication was given:     MEDICATION: Gentamycin 80mg  ROUTE: IM  SITE: Ventrogluteal - Left  DOSE: 80mg  LOT #: [id number]  :  [employer]  EXPIRATION DATE:  10/22  NDC#: 44732-556-20    Ofelia JUSTIN RN Urology 11/9/2021 2:37 PM

## 2021-11-12 LAB
PATH REPORT.COMMENTS IMP SPEC: ABNORMAL
PATH REPORT.COMMENTS IMP SPEC: ABNORMAL
PATH REPORT.COMMENTS IMP SPEC: YES
PATH REPORT.FINAL DX SPEC: ABNORMAL
PATH REPORT.GROSS SPEC: ABNORMAL
PATH REPORT.MICROSCOPIC SPEC OTHER STN: ABNORMAL
PATH REPORT.RELEVANT HX SPEC: ABNORMAL
PHOTO IMAGE: ABNORMAL

## 2021-11-16 ENCOUNTER — OFFICE VISIT (OUTPATIENT)
Dept: UROLOGY | Facility: CLINIC | Age: 61
End: 2021-11-16
Payer: COMMERCIAL

## 2021-11-16 VITALS — HEART RATE: 75 BPM | DIASTOLIC BLOOD PRESSURE: 76 MMHG | SYSTOLIC BLOOD PRESSURE: 139 MMHG | OXYGEN SATURATION: 96 %

## 2021-11-16 DIAGNOSIS — C61 PROSTATE CANCER (H): Primary | ICD-10-CM

## 2021-11-16 PROCEDURE — 99213 OFFICE O/P EST LOW 20 MIN: CPT | Performed by: UROLOGY

## 2021-11-16 NOTE — PATIENT INSTRUCTIONS
Please call the [facility] radiology to schedule an MRI of the prostate in 6 months. [phone].  Additionally, please arrange a lab for PSA test in 6 months.  Please arrange follow up to discuss results.  Please call our office if you have questions or need to report any information, [phone].    Please contact your insurance company to make sure the MRI scan is covered under your insurance plan.

## 2021-11-16 NOTE — PROGRESS NOTES
Chief Complaint   Patient presents with     RECHECK       Darnellmiguel Wick is a 61 year old male who presents today for follow up of   Chief Complaint   Patient presents with     RECHECK    f/u after biopsy of prostate due to elevated psa.  He is without any complaints.    Current Outpatient Medications   Medication Sig Dispense Refill     allopurinol (ZYLOPRIM) 100 MG tablet Take 1 tablet (100 mg) by mouth daily 90 tablet 3     amLODIPine (NORVASC) 10 MG tablet Take 1 tablet (10 mg) by mouth daily 90 tablet 3     carvedilol (COREG) 25 MG tablet Take 1 tablet (25 mg) by mouth 2 times daily (with meals) 180 tablet 2     hydrochlorothiazide (HYDRODIURIL) 12.5 MG tablet Take 1 tablet (12.5 mg) by mouth daily 90 tablet 2     levofloxacin (LEVAQUIN) 500 MG tablet Take 1 tablet (500 mg) by mouth daily Please start day of biopsy 3 tablet 0     losartan (COZAAR) 100 MG tablet Take 1 tablet (100 mg) by mouth daily 90 tablet 3     olopatadine (PATANOL) 0.1 % ophthalmic solution Place 1 drop into both eyes 2 times daily 5 mL 5     No Known Allergies   Past Medical History:   Diagnosis Date     Essential hypertension 2/8/2016     Prostate cancer (H) 11/16/2021     Past Surgical History:   Procedure Laterality Date     ORTHOPEDIC SURGERY      left middle finger     No family history on file.  Social History     Socioeconomic History     Marital status: Single     Spouse name: None     Number of children: None     Years of education: None     Highest education level: None   Occupational History     None   Tobacco Use     Smoking status: Never Smoker     Smokeless tobacco: Never Used   Substance and Sexual Activity     Alcohol use: Yes     Comment: Occasional, less than 1 drink per month     Drug use: No     Sexual activity: Yes     Partners: Female   Other Topics Concern     Parent/sibling w/ CABG, MI or angioplasty before 65F 55M? Not Asked   Social History Narrative     None     Social Determinants of Health     Financial  Resource Strain: Not on file   Food Insecurity: Not on file   Transportation Needs: Not on file   Physical Activity: Not on file   Stress: Not on file   Social Connections: Not on file   Intimate Partner Violence: Not on file   Housing Stability: Not on file       REVIEW OF SYSTEMS  =================  C: NEGATIVE for fever, chills, change in weight  I: NEGATIVE for worrisome rashes, moles or lesions  E/M: NEGATIVE for ear, mouth and throat problems  R: NEGATIVE for significant cough or SHORTNESS OF BREATH  CV:  NEGATIVE for chest pain, palpitations or peripheral edema  GI: NEGATIVE for nausea, abdominal pain, heartburn, or change in bowel habits  NEURO: NEGATIVE numbness/weakness  : see HPI  PSYCH: NEGATIVE depression/anxiety  LYmph: no new enlarged lymph nodes  Ortho: no new trauma/movements    Physical Exam:  /76 (BP Location: Right arm, Patient Position: Chair, Cuff Size: Adult Large)   Pulse 75   SpO2 96%    Patient is pleasant, in no acute distress, good general condition.  Lung: no evidence of respiratory distress    Abdomen: Soft, nondistended, non tender. No masses. No rebound or guarding.   Exam: normal male  Skin: Warm and dry.  No redness.  Psych: normal mood and affect  Neuro: alert and oriented  Musculaskeletal: moving all extremities    Final Diagnosis   A.  Prostate, left: Needle biopsy:  - Prostatic acinar adenocarcinoma, Ariton score 3 + 3 = 6, grade group 1, involving 1 of 6 core(s), measuring 1 mm in greatest linear dimension, representing less than 5% of total core volume      B.  Prostate, right: Needle biopsy:  - Benign prostatic tissue         Assessment/Plan:   (C61) Prostate cancer (H)  (primary encounter diagnosis)  Comment:    Plan:     Patient is a 61  year old MALE with history of newly diagnosed low risk prostate cancer (group 1 stage T1c). This is a patient with newly diagnosed prostate cancer.  I discussed several management options for prostate cancer with the patient.   The approach is discussed with her active surveillance, radical prostatectomy, radiation therapy in the form of external beam as well as brachytherapy.  We also talked about local therapy options such as cryotherapy and HLFU.  The risks and benefits of each of these approaches were explained in detail.  The risks discussed included risk of incontinence and impotence with surgical therapy.  There is also the immediate perioperative risk of wound infection blood transfusion and rectal injury.  With radiation therapy was discussed where cystitis, proctitis, hematuria, hematochezia.  There is also similar risks of erectile dysfunction with radiation therapy which reduce risk of incontinence.  There is a risk of urinary retention with brachytherapy.  I also explained that cryotherapy and HIFU are still considered less than standard of care as long-term data are lacking.    I recommend observation.  See me in clinic with psa/prostate MRI

## 2022-06-13 ENCOUNTER — NURSE TRIAGE (OUTPATIENT)
Dept: NURSING | Facility: CLINIC | Age: 62
End: 2022-06-13
Payer: COMMERCIAL

## 2022-06-13 ENCOUNTER — APPOINTMENT (OUTPATIENT)
Dept: ULTRASOUND IMAGING | Facility: CLINIC | Age: 62
End: 2022-06-13
Attending: FAMILY MEDICINE
Payer: COMMERCIAL

## 2022-06-13 ENCOUNTER — HOSPITAL ENCOUNTER (EMERGENCY)
Facility: CLINIC | Age: 62
Discharge: HOME | End: 2022-06-13
Attending: FAMILY MEDICINE | Admitting: FAMILY MEDICINE
Payer: COMMERCIAL

## 2022-06-13 VITALS
OXYGEN SATURATION: 98 % | HEART RATE: 87 BPM | BODY MASS INDEX: 30.46 KG/M2 | DIASTOLIC BLOOD PRESSURE: 74 MMHG | HEIGHT: 68 IN | RESPIRATION RATE: 18 BRPM | TEMPERATURE: 99.2 F | WEIGHT: 201 LBS | SYSTOLIC BLOOD PRESSURE: 132 MMHG

## 2022-06-13 DIAGNOSIS — M25.461 KNEE EFFUSION, RIGHT: ICD-10-CM

## 2022-06-13 PROCEDURE — 93971 EXTREMITY STUDY: CPT | Mod: RT

## 2022-06-13 PROCEDURE — 99284 EMERGENCY DEPT VISIT MOD MDM: CPT | Mod: 25 | Performed by: FAMILY MEDICINE

## 2022-06-13 PROCEDURE — 250N000012 HC RX MED GY IP 250 OP 636 PS 637: Performed by: FAMILY MEDICINE

## 2022-06-13 PROCEDURE — 99284 EMERGENCY DEPT VISIT MOD MDM: CPT | Performed by: FAMILY MEDICINE

## 2022-06-13 RX ORDER — PREDNISONE 20 MG/1
40 TABLET ORAL ONCE
Status: COMPLETED | OUTPATIENT
Start: 2022-06-13 | End: 2022-06-13

## 2022-06-13 RX ORDER — PREDNISONE 20 MG/1
TABLET ORAL
Qty: 10 TABLET | Refills: 0 | Status: SHIPPED | OUTPATIENT
Start: 2022-06-13 | End: 2022-07-06

## 2022-06-13 RX ADMIN — PREDNISONE 40 MG: 20 TABLET ORAL at 16:14

## 2022-06-13 NOTE — TELEPHONE ENCOUNTER
"Is this a 2nd Level Triage? YES, LICENSED PRACTITIONER REVIEW IS REQUIRED    Situation: Pain and swelling in left leg    Background: Started on 6/11/2022. Pt denies history of blood clots, and this has not happened before.     Assessment: Left leg swelling started Saturday. Pt did not call or go in to have it checked out. Pt reports he woke up and the pain and swelling was there. Pt reports the swelling extends from his ankle to the knee on the right leg only. Pt reports his calf and knee are hard. Pt denies any known injury. Pt rates pain \"5\" and gets worse with walking. Pt denies chest pain, difficulty breathing or any redness.       Protocol Recommended Disposition:   Go To ED/UCC Now (Or To Office With PCP Approval) Second Level Triage Approval Needed.     Please call Darnell at [phone] to advise.    Recommendation:     Routed to provider    Does the patient meet one of the following criteria for ADS visit consideration? 16+ years old, with an [facility] PCP     TIP  Providers, please consider if this condition is appropriate for management at one of our Acute and Diagnostic Services sites.     If patient is a good candidate, please use dotphrase <dot>triageresponse and select Refer to ADS to document.    Marco A Hancock RN on 6/13/2022 at 11:30 AM    Reason for Disposition    Thigh or calf pain and only 1 side and present > 1 hour    Thigh, calf, or ankle swelling in only one leg    Additional Information    Negative: Sounds like a life-threatening emergency to the triager    Negative: Chest pain    Negative: Small area of swelling and followed an insect bite to the area    Negative: Followed a knee injury    Negative: Ankle or foot injury    Negative: Pregnant with leg swelling or edema    Negative: Difficulty breathing at rest    Negative: Entire foot is cool or blue in comparison to other side    Negative: SEVERE swelling (e.g., swelling extends above knee, entire leg is swollen, weeping " fluid)    Protocols used: LEG SWELLING AND EDEMA-A-OH

## 2022-06-13 NOTE — ED TRIAGE NOTES
Patient reports he awoke Saturday with swelling to his right leg. Right knee pain. No hx of blood clots. Hx of HTN, on hydrochlorothiazide. No CP, SOB. No recent long travel or sitting. Denies numbness/tingling.     Triage Assessment     Row Name 06/13/22 9928       Triage Assessment (Adult)    Airway WDL WDL       Respiratory WDL    Respiratory WDL WDL       Skin Circulation/Temperature WDL    Skin Circulation/Temperature WDL WDL       Cardiac WDL    Cardiac WDL WDL       Peripheral/Neurovascular WDL    Peripheral Neurovascular WDL WDL       Cognitive/Neuro/Behavioral WDL    Cognitive/Neuro/Behavioral WDL WDL       Merrill Coma Scale    Best Eye Response 4-->(E4) spontaneous    Best Motor Response 6-->(M6) obeys commands    Best Verbal Response 5-->(V5) oriented    Merrill Coma Scale Score 15

## 2022-06-13 NOTE — ED PROVIDER NOTES
History     Chief Complaint   Patient presents with     Leg Swelling     HPI  Darnell Wick is a 62 year old male who comes in with pain and swelling of the right knee.  He awoke with this on Saturday morning, 2 days ago.  He recalls no specific trauma.  He does have a history of gout.  His right leg became swollen after this.  He has not had fevers, sweats, chills.  He has no history of DVT or PE.    Allergies:  No Known Allergies    Problem List:    Patient Active Problem List    Diagnosis Date Noted     Prostate cancer (H) 11/16/2021     Priority: Medium     Elevated prostate specific antigen (PSA) 07/23/2020     Priority: Medium     Hyperlipidemia LDL goal <130 02/26/2018     Priority: Medium     Hypertension, goal below 140/90 02/15/2016     Priority: Medium     Idiopathic gout of multiple sites, unspecified chronicity 02/08/2016     Priority: Medium        Past Medical History:    Past Medical History:   Diagnosis Date     Essential hypertension 2/8/2016     Prostate cancer (H) 11/16/2021       Past Surgical History:    Past Surgical History:   Procedure Laterality Date     ORTHOPEDIC SURGERY      left middle finger       Family History:    No family history on file.    Social History:  Marital Status:  Single [1]  Social History     Tobacco Use     Smoking status: Never Smoker     Smokeless tobacco: Never Used   Substance Use Topics     Alcohol use: Yes     Comment: Occasional, less than 1 drink per month     Drug use: No        Medications:    predniSONE (DELTASONE) 20 MG tablet  allopurinol (ZYLOPRIM) 100 MG tablet  amLODIPine (NORVASC) 10 MG tablet  carvedilol (COREG) 25 MG tablet  hydrochlorothiazide (HYDRODIURIL) 12.5 MG tablet  levofloxacin (LEVAQUIN) 500 MG tablet  losartan (COZAAR) 100 MG tablet  olopatadine (PATANOL) 0.1 % ophthalmic solution          Review of Systems  All other systems are reviewed and are negative    Physical Exam   BP: 132/74  Pulse: 87  Temp: 99.2  F (37.3  C)  Resp:  "18  Height: 172.7 cm (5' 8\")  Weight: 91.2 kg (201 lb)  SpO2: 98 %      Physical Exam     Nursing note and vitals were reviewed.  Constitutional: Awake and alert, adequately nourished and developed appearing 62-year-old in no apparent discomfort, who does not appear acutely ill, and who answers questions appropriately and cooperates with examination.  HEENT: EOMI.   Neck: Freely mobile.  Pulmonary/Chest: Breathing is unlabored.    Musculoskeletal: Examination of the right leg reveals a moderate sized effusion in the right knee but full range of motion with minimal discomfort on flexion and extension of the knee.  No erythema, tenderness, warmth.  No ligamentous instability.  2+ pretibial pitting edema is present below the knee and into the foot.  Neurological: Alert, oriented, thought content logical, coherent   Skin: Warm, dry, no rashes.  Psychiatric: Affect broad and appropriate.      ED Course                 Procedures              Critical Care time:  none               Results for orders placed or performed during the hospital encounter of 06/13/22 (from the past 24 hour(s))   US Lower Extremity Venous Duplex Right    Narrative    ULTRASOUND RIGHT LOWER EXTREMITY VENOUS DUPLEX June 13, 2022 3:13 PM    CLINICAL HISTORY/INDICATION: Right leg swelling and pain.    COMPARISON: None relevant    TECHNIQUE: Grayscale, color-flow, and spectral waveform analysis were  performed of the deep veins of the right lower extremity    FINDINGS: The right common femoral vein, femoral vein, popliteal vein,  peroneal vein and posterior tibial vein demonstrate normal  compressibility, spectral waveform, color flow and augmentation. Small  knee effusion is noted at the site of patient's pain. Subcutaneous  edema is noted.    The contralateral left common femoral vein demonstrates normal  compressibility, spectral waveform, color flow and augmentation.      Impression    IMPRESSION:   1. No evidence of deep venous thrombosis in the " right lower extremity.  2. Small knee effusion at the site of patient's pain.  3. Subcutaneous edema noted throughout.    RENZO DO CATE         SYSTEM ID:  [id number]       Medications   predniSONE (DELTASONE) tablet 40 mg (has no administration in time range)       Assessments & Plan (with Medical Decision Making)     62-year-old male presented with sudden onset of swelling and pain in the right knee.  Examination shows good range of motion of the knee with a moderate effusion but minimal discomfort on range of motion.  There is no erythema or warmth.  My suspicion for joint infection is thus quite low.  He does have a history of gout and this is most likely the cause.  He is taking hydrochlorothiazide and should talk with his primary physician about discontinuing because it can increase the uric acid level and trigger attacks of gout.  He says he is compliant with his allopurinol.  We discussed the option of joint aspiration to confirm the diagnosis and rule out other causes including the pros and cons of this approach including a nondiagnostic tap and introduction of infection.  We have decided we will treat this empirically as gout with prednisone 40 mg daily for 5 days.  He should rest and elevate his leg is much as possible for the next 2 days.  He should return to be seen if he does not have significant improvement in 24 hours or has new or worsening symptoms at any time.  He expressed understanding and his questions were answered    I have reviewed the nursing notes.    I have reviewed the findings, diagnosis, plan and need for follow up with the patient.       New Prescriptions    PREDNISONE (DELTASONE) 20 MG TABLET    Take two tablets (= 40mg) each day for 5 (five) days       Final diagnoses:   Knee effusion, right       6/13/2022   [facility] EMERGENCY DEPT     Robert Dunbar MD  06/13/22 [id number]

## 2022-06-13 NOTE — TELEPHONE ENCOUNTER
Should be seen/evaluated in person today to work up possible DVT. If no appts available in clinic he should go to [facility]

## 2022-06-13 NOTE — LETTER
June 13, 2022      To Whom It May Concern:      Darnell Wick was seen in our Emergency Department today, 06/13/22.  Please excuse him from work June 13 to 14, 2022, due to an acute medical problem.    Sincerely,        Robert Dunbar MD

## 2022-06-13 NOTE — ED NOTES
First contact with patient.  All discharge home information given and all questions answered.  Patient ambulates out of department with steady gate.

## 2022-06-13 NOTE — TELEPHONE ENCOUNTER
Called patient and relayed the message below. No appointments at [place] and patient stated he will go up to [place] urgent care right now.     Luba Lira RN

## 2022-06-16 ENCOUNTER — HOSPITAL ENCOUNTER (EMERGENCY)
Facility: CLINIC | Age: 62
Discharge: HOME | End: 2022-06-16
Attending: EMERGENCY MEDICINE | Admitting: EMERGENCY MEDICINE
Payer: COMMERCIAL

## 2022-06-16 ENCOUNTER — TELEPHONE (OUTPATIENT)
Dept: FAMILY MEDICINE | Facility: CLINIC | Age: 62
End: 2022-06-16
Payer: COMMERCIAL

## 2022-06-16 VITALS
HEART RATE: 72 BPM | SYSTOLIC BLOOD PRESSURE: 128 MMHG | DIASTOLIC BLOOD PRESSURE: 76 MMHG | BODY MASS INDEX: 31.17 KG/M2 | RESPIRATION RATE: 16 BRPM | WEIGHT: 205 LBS | TEMPERATURE: 98.8 F | OXYGEN SATURATION: 99 %

## 2022-06-16 DIAGNOSIS — L03.115 CELLULITIS OF RIGHT LEG: ICD-10-CM

## 2022-06-16 LAB
ALBUMIN SERPL-MCNC: 3.1 G/DL (ref 3.4–5)
ALP SERPL-CCNC: 71 U/L (ref 40–150)
ALT SERPL W P-5'-P-CCNC: 54 U/L (ref 0–70)
ANION GAP SERPL CALCULATED.3IONS-SCNC: 7 MMOL/L (ref 3–14)
APPEARANCE FLD: ABNORMAL
AST SERPL W P-5'-P-CCNC: 62 U/L (ref 0–45)
BASOPHILS # BLD AUTO: 0 10E3/UL (ref 0–0.2)
BASOPHILS NFR BLD AUTO: 0 %
BILIRUB SERPL-MCNC: 1.2 MG/DL (ref 0.2–1.3)
BUN SERPL-MCNC: 19 MG/DL (ref 7–30)
CALCIUM SERPL-MCNC: 9.1 MG/DL (ref 8.5–10.1)
CELL COUNT BODY FLUID SOURCE: ABNORMAL
CHLORIDE BLD-SCNC: 102 MMOL/L (ref 94–109)
CO2 SERPL-SCNC: 26 MMOL/L (ref 20–32)
COLOR FLD: YELLOW
CREAT SERPL-MCNC: 0.96 MG/DL (ref 0.66–1.25)
CRP SERPL-MCNC: 52 MG/L (ref 0–8)
CRYSTALS SNV MICRO: NORMAL
EOSINOPHIL # BLD AUTO: 0 10E3/UL (ref 0–0.7)
EOSINOPHIL NFR BLD AUTO: 0 %
ERYTHROCYTE [DISTWIDTH] IN BLOOD BY AUTOMATED COUNT: 11.6 % (ref 10–15)
ERYTHROCYTE [SEDIMENTATION RATE] IN BLOOD BY WESTERGREN METHOD: 39 MM/HR (ref 0–20)
GFR SERPL CREATININE-BSD FRML MDRD: 90 ML/MIN/1.73M2
GLUCOSE BLD-MCNC: 204 MG/DL (ref 70–99)
GRAM STAIN RESULT: NORMAL
GRAM STAIN RESULT: NORMAL
HCT VFR BLD AUTO: 35.8 % (ref 40–53)
HGB BLD-MCNC: 12.7 G/DL (ref 13.3–17.7)
HOLD SPECIMEN: NORMAL
IMM GRANULOCYTES # BLD: 0.1 10E3/UL
IMM GRANULOCYTES NFR BLD: 0 %
LYMPHOCYTES # BLD AUTO: 0.9 10E3/UL (ref 0.8–5.3)
LYMPHOCYTES NFR BLD AUTO: 6 %
LYMPHOCYTES NFR FLD MANUAL: 26 %
MCH RBC QN AUTO: 35.7 PG (ref 26.5–33)
MCHC RBC AUTO-ENTMCNC: 35.5 G/DL (ref 31.5–36.5)
MCV RBC AUTO: 101 FL (ref 78–100)
MONOCYTES # BLD AUTO: 1.4 10E3/UL (ref 0–1.3)
MONOCYTES NFR BLD AUTO: 10 %
MONOS+MACROS NFR FLD MANUAL: 24 %
NEUTROPHILS # BLD AUTO: 11.7 10E3/UL (ref 1.6–8.3)
NEUTROPHILS NFR BLD AUTO: 84 %
NEUTS BAND NFR FLD MANUAL: 50 %
NRBC # BLD AUTO: 0 10E3/UL
NRBC BLD AUTO-RTO: 0 /100
PLATELET # BLD AUTO: 306 10E3/UL (ref 150–450)
POTASSIUM BLD-SCNC: 3.6 MMOL/L (ref 3.4–5.3)
PROT SERPL-MCNC: 7.7 G/DL (ref 6.8–8.8)
RBC # BLD AUTO: 3.56 10E6/UL (ref 4.4–5.9)
SODIUM SERPL-SCNC: 135 MMOL/L (ref 133–144)
WBC # BLD AUTO: 14 10E3/UL (ref 4–11)
WBC # FLD AUTO: 104 /UL

## 2022-06-16 PROCEDURE — 89060 EXAM SYNOVIAL FLUID CRYSTALS: CPT | Performed by: EMERGENCY MEDICINE

## 2022-06-16 PROCEDURE — 85652 RBC SED RATE AUTOMATED: CPT | Performed by: EMERGENCY MEDICINE

## 2022-06-16 PROCEDURE — 87070 CULTURE OTHR SPECIMN AEROBIC: CPT | Performed by: EMERGENCY MEDICINE

## 2022-06-16 PROCEDURE — 99284 EMERGENCY DEPT VISIT MOD MDM: CPT | Mod: 25 | Performed by: EMERGENCY MEDICINE

## 2022-06-16 PROCEDURE — 36415 COLL VENOUS BLD VENIPUNCTURE: CPT | Performed by: EMERGENCY MEDICINE

## 2022-06-16 PROCEDURE — 89051 BODY FLUID CELL COUNT: CPT | Performed by: EMERGENCY MEDICINE

## 2022-06-16 PROCEDURE — 20610 DRAIN/INJ JOINT/BURSA W/O US: CPT | Performed by: EMERGENCY MEDICINE

## 2022-06-16 PROCEDURE — 80053 COMPREHEN METABOLIC PANEL: CPT | Performed by: EMERGENCY MEDICINE

## 2022-06-16 PROCEDURE — 82040 ASSAY OF SERUM ALBUMIN: CPT | Performed by: EMERGENCY MEDICINE

## 2022-06-16 PROCEDURE — 96365 THER/PROPH/DIAG IV INF INIT: CPT | Mod: 59 | Performed by: EMERGENCY MEDICINE

## 2022-06-16 PROCEDURE — 85025 COMPLETE CBC W/AUTO DIFF WBC: CPT | Performed by: EMERGENCY MEDICINE

## 2022-06-16 PROCEDURE — 86140 C-REACTIVE PROTEIN: CPT | Performed by: EMERGENCY MEDICINE

## 2022-06-16 PROCEDURE — 87040 BLOOD CULTURE FOR BACTERIA: CPT | Performed by: EMERGENCY MEDICINE

## 2022-06-16 PROCEDURE — 87205 SMEAR GRAM STAIN: CPT | Performed by: EMERGENCY MEDICINE

## 2022-06-16 PROCEDURE — 250N000011 HC RX IP 250 OP 636: Performed by: EMERGENCY MEDICINE

## 2022-06-16 RX ORDER — AMPICILLIN AND SULBACTAM 2; 1 G/1; G/1
3 INJECTION, POWDER, FOR SOLUTION INTRAMUSCULAR; INTRAVENOUS ONCE
Status: COMPLETED | OUTPATIENT
Start: 2022-06-16 | End: 2022-06-16

## 2022-06-16 RX ORDER — SULFAMETHOXAZOLE/TRIMETHOPRIM 800-160 MG
1 TABLET ORAL 2 TIMES DAILY
Qty: 20 TABLET | Refills: 0 | Status: SHIPPED | OUTPATIENT
Start: 2022-06-16 | End: 2022-06-26

## 2022-06-16 RX ADMIN — AMPICILLIN SODIUM AND SULBACTAM SODIUM 3 G: 2; 1 INJECTION, POWDER, FOR SOLUTION INTRAMUSCULAR; INTRAVENOUS at 11:33

## 2022-06-16 ASSESSMENT — ENCOUNTER SYMPTOMS
SEIZURES: 0
COUGH: 0
AGITATION: 0
BLOOD IN STOOL: 0
NECK STIFFNESS: 0
CHILLS: 0
SHORTNESS OF BREATH: 0
FEVER: 0
HEADACHES: 0
FREQUENCY: 0
DYSURIA: 0
VOMITING: 0
EYE REDNESS: 0
PALPITATIONS: 0
MYALGIAS: 0
SORE THROAT: 0
NUMBNESS: 0
CHEST TIGHTNESS: 0
WEAKNESS: 0
ABDOMINAL PAIN: 0
FLANK PAIN: 0
NAUSEA: 0
LIGHT-HEADEDNESS: 0
NERVOUS/ANXIOUS: 0
BACK PAIN: 0
DIARRHEA: 0
EYE DISCHARGE: 0
CONFUSION: 0
ARTHRALGIAS: 1

## 2022-06-16 NOTE — DISCHARGE INSTRUCTIONS
Elevation with Ace wrap  Take the antibiotics as prescribed  Tylenol 650 every 4 hours as needed for pain  Ibuprofen 600 mg 3 times a day as needed for pain  Await culture results  Primary care early next week if not improving.

## 2022-06-16 NOTE — ED TRIAGE NOTES
Pt R knee is swollen started a couple weeks ago.  Pt denies injury.  Pt states he was here on Monday dx with gout and told to come back if it gets worse.  Pt taking medication as prescribed.  Pain is 10/10 now.

## 2022-06-16 NOTE — TELEPHONE ENCOUNTER
"S-(situation): right knee pain/swelling, now has new redness/warmth extending into RLE    B-(background): pt was seen in ER 6/13 for right knee swelling/pain. Dx gout & started on prednisone x 5 days. Pt has 2 days left. States pain, swelling worse & now has redness, warmth. No hx DVT, states was checked when in ER. Was off of work for 2 days & went back yesterday & lasted 3.5 hrs. Had to leave due to pain.    A-(assessment): states Rt knee with increased swelling under kneecap. Now has new redness/warmth on outside of RLE down to ankle. Some swelling in rt calf-\"feels tight\". Warm to touch. States now he cannot bend knee, pain 9/10, constant & \"feels like there are electrodes sticking into knee\".  Pt has rested with leg elevated, used ice for 2 hrs yesterday with no relief.   Denies CP, SOA, doesn't feel like he has had a fever.    R-(recommendations): per ER notes, pt to return if has worsening sx. Advised ER. Pt verbalized understanding & will go there now.    aGviota Marin RN      "

## 2022-06-16 NOTE — ED PROVIDER NOTES
History     Chief Complaint   Patient presents with     Knee Pain     Joint Swelling     HPI  Darnell Wick is a 62 year old male who presents for evaluation of right-sided knee swelling.  This started about 5 days ago.  Patient was seen on 6/13/2022 in the emergency department.  At that point he had right-sided knee pain and swelling.  Imaging was obtained which showed no evidence of DVT.  There was a small knee effusion.  Subcutaneous edema was also identified.  Patient was treated with prednisone for presumed gout.  He reports that he has ongoing right-sided knee pain with increased redness and swelling in the front of the leg.  He denies fever or chills.  He has no numbness or weakness.  He has no nausea or vomiting.  He continues to deny trauma.  He has no chest pain or shortness of breath.    Allergies:  No Known Allergies    Problem List:    Patient Active Problem List    Diagnosis Date Noted     Prostate cancer (H) 11/16/2021     Priority: Medium     Elevated prostate specific antigen (PSA) 07/23/2020     Priority: Medium     Hyperlipidemia LDL goal <130 02/26/2018     Priority: Medium     Hypertension, goal below 140/90 02/15/2016     Priority: Medium     Idiopathic gout of multiple sites, unspecified chronicity 02/08/2016     Priority: Medium        Past Medical History:    Past Medical History:   Diagnosis Date     Essential hypertension 2/8/2016     Prostate cancer (H) 11/16/2021       Past Surgical History:    Past Surgical History:   Procedure Laterality Date     ORTHOPEDIC SURGERY      left middle finger       Family History:    No family history on file.    Social History:  Marital Status:  Single [1]  Social History     Tobacco Use     Smoking status: Never Smoker     Smokeless tobacco: Never Used   Substance Use Topics     Alcohol use: Yes     Comment: Occasional, less than 1 drink per month     Drug use: No        Medications:    allopurinol (ZYLOPRIM) 100 MG tablet  amLODIPine (NORVASC) 10  MG tablet  carvedilol (COREG) 25 MG tablet  hydrochlorothiazide (HYDRODIURIL) 12.5 MG tablet  levofloxacin (LEVAQUIN) 500 MG tablet  losartan (COZAAR) 100 MG tablet  olopatadine (PATANOL) 0.1 % ophthalmic solution  predniSONE (DELTASONE) 20 MG tablet          Review of Systems   Constitutional: Negative for chills and fever.   HENT: Negative for sore throat.    Eyes: Negative for discharge and redness.   Respiratory: Negative for cough, chest tightness and shortness of breath.    Cardiovascular: Negative for chest pain, palpitations and leg swelling.   Gastrointestinal: Negative for abdominal pain, blood in stool, diarrhea, nausea and vomiting.   Genitourinary: Negative for dysuria, flank pain and frequency.   Musculoskeletal: Positive for arthralgias and gait problem. Negative for back pain, myalgias and neck stiffness.   Skin: Negative for pallor.   Neurological: Negative for seizures, weakness, light-headedness, numbness and headaches.   Psychiatric/Behavioral: Negative for agitation and confusion. The patient is not nervous/anxious.        Physical Exam   BP: 128/76  Pulse: 72  Temp: 98.8  F (37.1  C)  Resp: 16  Weight: 93 kg (205 lb)  SpO2: 99 %      Physical Exam  Vitals and nursing note reviewed.   Constitutional:       General: He is not in acute distress.     Appearance: He is well-developed. He is not diaphoretic.   HENT:      Head: Normocephalic and atraumatic.   Eyes:      General: No scleral icterus.     Pupils: Pupils are equal, round, and reactive to light.   Cardiovascular:      Rate and Rhythm: Normal rate and regular rhythm.      Heart sounds: Normal heart sounds. No murmur heard.  Pulmonary:      Effort: No respiratory distress.      Breath sounds: No stridor. No wheezing or rales.   Abdominal:      General: Bowel sounds are normal.      Palpations: Abdomen is soft.      Tenderness: There is no abdominal tenderness.   Musculoskeletal:         General: No tenderness.      Cervical back: Normal  range of motion and neck supple.        Legs:    Skin:     General: Skin is warm and dry.      Coloration: Skin is not pale.      Findings: No erythema or rash.   Neurological:      Mental Status: He is alert and oriented to person, place, and time.      Sensory: No sensory deficit.      Coordination: Coordination normal.         ED Course                 Procedures     Right knee arthrocentesis:    Patient's knee was sterilized with iodine.  2 cc of 0.25% bupivacaine was injected along the superior and medial aspect of the right patella to avoid the area of redness and inflammation.  An 18-gauge needle was aimed inferior and posterior to the patella with removal of 7 cc of watery yellowish fluid suggestive of synovial fluid.  This was sent for testing including culture, Gram stain and cell count, and crystal analysis.  A Band-Aid was placed.  Patient tolerated the procedure well.                  Results for orders placed or performed during the hospital encounter of 06/16/22 (from the past 24 hour(s))   Comprehensive metabolic panel   Result Value Ref Range    Sodium 135 133 - 144 mmol/L    Potassium 3.6 3.4 - 5.3 mmol/L    Chloride 102 94 - 109 mmol/L    Carbon Dioxide (CO2) 26 20 - 32 mmol/L    Anion Gap 7 3 - 14 mmol/L    Urea Nitrogen 19 7 - 30 mg/dL    Creatinine 0.96 0.66 - 1.25 mg/dL    Calcium 9.1 8.5 - 10.1 mg/dL    Glucose 204 (H) 70 - 99 mg/dL    Alkaline Phosphatase 71 40 - 150 U/L    AST 62 (H) 0 - 45 U/L    ALT 54 0 - 70 U/L    Protein Total 7.7 6.8 - 8.8 g/dL    Albumin 3.1 (L) 3.4 - 5.0 g/dL    Bilirubin Total 1.2 0.2 - 1.3 mg/dL    GFR Estimate 90 >60 mL/min/1.73m2   CRP inflammation   Result Value Ref Range    CRP Inflammation 52.0 (H) 0.0 - 8.0 mg/L   Erythrocyte sedimentation rate auto   Result Value Ref Range    Erythrocyte Sedimentation Rate 39 (H) 0 - 20 mm/hr   CBC with platelets and differential   Result Value Ref Range    WBC Count 14.0 (H) 4.0 - 11.0 10e3/uL    RBC Count 3.56 (L) 4.40 -  5.90 10e6/uL    Hemoglobin 12.7 (L) 13.3 - 17.7 g/dL    Hematocrit 35.8 (L) 40.0 - 53.0 %     (H) 78 - 100 fL    MCH 35.7 (H) 26.5 - 33.0 pg    MCHC 35.5 31.5 - 36.5 g/dL    RDW 11.6 10.0 - 15.0 %    Platelet Count 306 150 - 450 10e3/uL    % Neutrophils 84 %    % Lymphocytes 6 %    % Monocytes 10 %    % Eosinophils 0 %    % Basophils 0 %    % Immature Granulocytes 0 %    NRBCs per 100 WBC 0 <1 /100    Absolute Neutrophils 11.7 (H) 1.6 - 8.3 10e3/uL    Absolute Lymphocytes 0.9 0.8 - 5.3 10e3/uL    Absolute Monocytes 1.4 (H) 0.0 - 1.3 10e3/uL    Absolute Eosinophils 0.0 0.0 - 0.7 10e3/uL    Absolute Basophils 0.0 0.0 - 0.2 10e3/uL    Absolute Immature Granulocytes 0.1 <=0.4 10e3/uL    Absolute NRBCs 0.0 10e3/uL   Cell count with differential fluid    Narrative    The following orders were created for panel order Cell count with differential fluid.  Procedure                               Abnormality         Status                     ---------                               -----------         ------                     Cell Count Body Fluid[[id number]]                            In process                 Differential Body Fluid[[id number]]                          In process                   Please view results for these tests on the individual orders.       Medications   ampicillin-sulbactam (UNASYN) 3 g vial to attach to  mL bag (0 g Intravenous Stopped 6/16/22 1217)       Assessments & Plan (with Medical Decision Making)     I have reviewed the nursing notes.    I have reviewed the findings, diagnosis, plan and need for follow up with the patient.  Patient is a 62-year-old male who was seen a few days ago for evaluation of right-sided knee pain which started about 5 days ago.  His discomfort has been worsening despite being placed on prednisone for presumed gout.  Patient does report that he has had gout in the past mainly of the upper extremity.  He has no fever or chills.  He has no known injury.  He  has no numbness or weakness.  No other joints appear inflamed.    On exam, patient's blood pressure is 128/76 with a pulse rate of 72 and temperature 98.8.  Respirations are 16 and O2 saturations are 99%.    Patient has redness along the inferior portion of the patella anteriorly down to the mid lower leg.  Area of maximal tenderness is in that inferior patellar region.  He does have some swelling within the joint space.  I could not rule out septic joint, and a diagnostic arthrocentesis was performed.  The fluid had the appearance of synovial fluid and did not look grossly infected.  I did speak to orthopedic surgery.    Patient's white count is 14.0 with a hemoglobin of 12.7.  Other inflammatory markers were elevated including CRP of 52 and erythrocyte sedimentation rate of 39.  Comprehensive metabolic profile was unremarkable with the exception of a glucose of 204, AST of 62, and albumin of 3.1.    I believe this most closely resembles a prepatellar bursitis with surrounding cellulitis.  Patient was given a dose of Unasyn while in the emergency department.  He was initiated on Augmentin for typical skin charles, and was also given sulfa for potential MRSA.  There still is a possibility that this is gout.  We will await crystal analysis and culture result.    He will return should he have increasing pain, fevers, vomiting, or neurologic deficits.    Discharge Medication List as of 6/16/2022  1:36 PM      START taking these medications    Details   amoxicillin-clavulanate (AUGMENTIN) 875-125 MG tablet Take 1 tablet by mouth 2 times daily for 10 days, Disp-20 tablet, R-0, E-Prescribe      sulfamethoxazole-trimethoprim (BACTRIM DS) 800-160 MG tablet Take 1 tablet by mouth 2 times daily for 10 days, Disp-20 tablet, R-0, E-Prescribe             Final diagnoses:   Cellulitis of right leg       6/16/2022   [facility] EMERGENCY DEPT     Tawanda Ridley MD  06/17/22 2041

## 2022-06-17 NOTE — RESULT ENCOUNTER NOTE
[facility] Emergency Dept discharge antibiotic prescribed: Amoxicillin-Clavulanate (Augmentin) 875-125 mg PO tablet, 1 tablet by mouth 2 times daily for 10 days AND Sulfamethoxazole-Trimethoprim (Bactrim DS, Septra DS) 800-160 mg PO tablet,  1 tablet by mouth 2 times daily for 10 days.  Incision and Drainage performed in [facility] Emergency Dept [Yes or No]: not incomplete  Recommendations in treatment per [facility] ED Lab Result culture protocol

## 2022-06-21 LAB
BACTERIA BLD CULT: NO GROWTH
BACTERIA BLD CULT: NO GROWTH
BACTERIA SNV CULT: NO GROWTH

## 2022-07-06 ENCOUNTER — OFFICE VISIT (OUTPATIENT)
Dept: FAMILY MEDICINE | Facility: CLINIC | Age: 62
End: 2022-07-06

## 2022-07-06 VITALS
DIASTOLIC BLOOD PRESSURE: 81 MMHG | RESPIRATION RATE: 18 BRPM | TEMPERATURE: 98.7 F | HEIGHT: 68 IN | HEART RATE: 82 BPM | SYSTOLIC BLOOD PRESSURE: 157 MMHG | BODY MASS INDEX: 29.78 KG/M2 | WEIGHT: 196.5 LBS | OXYGEN SATURATION: 97 %

## 2022-07-06 DIAGNOSIS — M00.80: Primary | ICD-10-CM

## 2022-07-06 LAB
CRP SERPL-MCNC: 3 MG/L (ref 0–8)
ERYTHROCYTE [DISTWIDTH] IN BLOOD BY AUTOMATED COUNT: 11.9 % (ref 10–15)
ERYTHROCYTE [SEDIMENTATION RATE] IN BLOOD BY WESTERGREN METHOD: 22 MM/HR (ref 0–20)
HCT VFR BLD AUTO: 39.5 % (ref 40–53)
HGB BLD-MCNC: 13.4 G/DL (ref 13.3–17.7)
MCH RBC QN AUTO: 34.9 PG (ref 26.5–33)
MCHC RBC AUTO-ENTMCNC: 33.9 G/DL (ref 31.5–36.5)
MCV RBC AUTO: 103 FL (ref 78–100)
PLATELET # BLD AUTO: 410 10E3/UL (ref 150–450)
RBC # BLD AUTO: 3.84 10E6/UL (ref 4.4–5.9)
WBC # BLD AUTO: 7.6 10E3/UL (ref 4–11)

## 2022-07-06 PROCEDURE — 99214 OFFICE O/P EST MOD 30 MIN: CPT | Mod: 25 | Performed by: PHYSICIAN ASSISTANT

## 2022-07-06 PROCEDURE — 86431 RHEUMATOID FACTOR QUANT: CPT | Performed by: PHYSICIAN ASSISTANT

## 2022-07-06 PROCEDURE — 99000 SPECIMEN HANDLING OFFICE-LAB: CPT | Performed by: PHYSICIAN ASSISTANT

## 2022-07-06 PROCEDURE — 86038 ANTINUCLEAR ANTIBODIES: CPT | Performed by: PHYSICIAN ASSISTANT

## 2022-07-06 PROCEDURE — 36415 COLL VENOUS BLD VENIPUNCTURE: CPT | Performed by: PHYSICIAN ASSISTANT

## 2022-07-06 PROCEDURE — 99207 E-CONSULT TO SPORTS MEDICINE/NON-SURGICAL ORTHOPEDICS (ADULT OUTPT PROVIDER TO SPECIALIST WRITTEN QUESTION & RESPONSE): CPT | Performed by: PHYSICIAN ASSISTANT

## 2022-07-06 PROCEDURE — 85027 COMPLETE CBC AUTOMATED: CPT | Performed by: PHYSICIAN ASSISTANT

## 2022-07-06 PROCEDURE — 85652 RBC SED RATE AUTOMATED: CPT | Performed by: PHYSICIAN ASSISTANT

## 2022-07-06 PROCEDURE — 86618 LYME DISEASE ANTIBODY: CPT | Performed by: PHYSICIAN ASSISTANT

## 2022-07-06 PROCEDURE — 86140 C-REACTIVE PROTEIN: CPT | Performed by: PHYSICIAN ASSISTANT

## 2022-07-06 PROCEDURE — 86060 ANTISTREPTOLYSIN O TITER: CPT | Mod: 90 | Performed by: PHYSICIAN ASSISTANT

## 2022-07-06 PROCEDURE — 86235 NUCLEAR ANTIGEN ANTIBODY: CPT | Performed by: PHYSICIAN ASSISTANT

## 2022-07-06 RX ORDER — CEFTRIAXONE SODIUM 2 G
0.35 VIAL (EA) INJECTION ONCE
Status: ACTIVE | OUTPATIENT
Start: 2022-07-06

## 2022-07-06 ASSESSMENT — PAIN SCALES - GENERAL: PAINLEVEL: MODERATE PAIN (4)

## 2022-07-06 NOTE — PROGRESS NOTES
"Health Maintenance Due   Topic Date Due     ZOSTER IMMUNIZATION (1 of 2) Never done     COLORECTAL CANCER SCREENING  02/22/2020     COVID-19 Vaccine (2 - Booster for Isabella series) 09/04/2021     PHQ-2 (once per calendar year)  01/01/2022     Reviewed overdue health maintenance topics with patient.  Jayla Trevino PA-C on 7/6/2022 at 11:02 AM      Assessment & Plan     1. Bacterial arthritis (H)        Infectious arthritis versus underlying autoimmune cause. Rocephin 2g IM today. Labs in process. E-consult to ortho/sports med for further recommendations. Follow up pending lab results or ortho response.       Review of prior external note(s) from - [place] [facility]  Ordering of each unique test  Prescription drug management         BMI:   Estimated body mass index is 29.88 kg/m  as calculated from the following:    Height as of this encounter: 1.727 m (5' 8\").    Weight as of this encounter: 89.1 kg (196 lb 8 oz).         Return for follow up pending ortho recommendations and labs.    Jayla Trevino PA-C  [facility] ZEL[facility] Clemente is a 62 year old, presenting for the following health issues:  Knee Pain (Right knee) and Derm Problem (Red raised bumps for 3 weeks)      HPI     Concern - Cellulitis of right knee and ankle  Onset: 6/13/2022  Description: Right knee is red swollen and draining white/yellow foul smelling drainiage   Progression of Symptoms:  Got better when on prednisone and now worse  Accompanying Signs & Symptoms: Red raised bumpy rash  Previous history of similar problem: no  Precipitating factors:        Worsened by:   Alleviating factors:        Improved by:   Therapies tried and outcome: prednisone and levaquin    Rash  Onset/Duration: 6/13/22  Description  Location: arms and thighs bilateral  Character: round, raised, red  Itching: mild  Intensity:  moderate  Progression of Symptoms:  same  Accompanying signs and symptoms:   Fever: No  Body aches or joint pain: " "No  Sore throat symptoms: No  Recent cold symptoms: No  History:           Previous episodes of similar rash: None  New exposures:  None  Recent travel: No  Exposure to similar rash: No  Precipitating or alleviating factors:   Therapies tried and outcome: hydrocortisone cream -  not effective    Initially put on prednisone 40mg x5 days - no improvement after 3 days  90% improvement with abx  Rash present before abx      Review of Systems         Objective    BP (!) 157/81   Pulse 82   Temp 98.7  F (37.1  C) (Temporal)   Resp 18   Ht 1.727 m (5' 8\")   Wt 89.1 kg (196 lb 8 oz)   SpO2 97%   BMI 29.88 kg/m    Body mass index is 29.88 kg/m .  Physical Exam                       .  ..  "

## 2022-07-07 LAB
ANA SER QL IF: NEGATIVE
ASO AB SERPL-ACNC: <55 IU/ML
B BURGDOR IGG+IGM SER QL: 0.38
RHEUMATOID FACT SER NEPH-ACNC: <6 IU/ML

## 2022-07-10 LAB
ENA SM IGG SER IA-ACNC: 1.9 U/ML
ENA SM IGG SER IA-ACNC: NEGATIVE
ENA SS-A AB SER IA-ACNC: <0.5 U/ML
ENA SS-A AB SER IA-ACNC: NEGATIVE
ENA SS-B IGG SER IA-ACNC: <0.6 U/ML
ENA SS-B IGG SER IA-ACNC: NEGATIVE
U1 SNRNP IGG SER IA-ACNC: <1.1 U/ML
U1 SNRNP IGG SER IA-ACNC: NEGATIVE

## 2022-07-12 ENCOUNTER — E-CONSULT (OUTPATIENT)
Dept: ORTHOPEDICS | Facility: CLINIC | Age: 62
End: 2022-07-12
Payer: COMMERCIAL

## 2022-07-12 DIAGNOSIS — L03.116 CELLULITIS OF LEFT KNEE: Primary | ICD-10-CM

## 2022-07-12 PROCEDURE — 99451 NTRPROF PH1/NTRNET/EHR 5/>: CPT | Performed by: FAMILY MEDICINE

## 2022-07-12 NOTE — PROGRESS NOTES
ALL SMARTFIELDS MUST BE COMPLETED FOR PATIENT CARE AND BILLING    7/12/2022     E-Consult has been accepted.    Interprofessional consultation requested by:  Jayla Trevino PA-C      Clinical Question/Purpose: MY CLINICAL QUESTION IS: What is the next step for recurrent presumptive bacterial arthritis?    Patient assessment and information reviewed:  Reviewed history, clinical examination, lab results and US of right lower extremity.    Inflammatory labs  downtrending, suggesting improvement in condition compared to 6/16/22.    Crystals, gram stain, and aerobic culture negative suggest against septic joint    Recommendations:   At this time there is insufficient evidence to suggest bacterial arthritis or septic joint is present .  Based on aspirate labs, there is no findings to suggest any joint injection.  I would favor the diagnosis of recurrent cellulitis with failure of oral antibiotic treatment if indeed clinically, erythema and drainage continues.    At this time, infectious disease consultation would be favored to treat cellulitis infection refractory to oral antibiotics augmentin and Bactrim DS.      The recommendations provided in this E-Consult are based on a review of clinical data pertinent to the clinical question presented, without a review of the patient's complete medical record or, the benefit of a comprehensive in-person or virtual patient evaluation. This consultation should not replace the clinical judgement and evaluation of the provider ordering this E-Consult. Any new clinical issues, or changes in patient status since the filing of this E-Consult will need to be taken into account when assessing these recommendations. Please contact me if you have further questions.    My total time spent reviewing clinical information and formulating assessment was 15 minutes. Care discussed with orthopedic surgery, Dr. Emani Bryant as well    Report sent automatically to requesting provider once  signed.     Tawanda Hardwick, DO

## 2022-07-13 ENCOUNTER — TELEPHONE (OUTPATIENT)
Dept: FAMILY MEDICINE | Facility: CLINIC | Age: 62
End: 2022-07-13

## 2022-07-13 DIAGNOSIS — L03.119 RECURRENT CELLULITIS OF LOWER EXTREMITY: Primary | ICD-10-CM

## 2022-07-13 DIAGNOSIS — M25.461 SWELLING OF JOINT OF RIGHT KNEE: ICD-10-CM

## 2022-07-13 NOTE — TELEPHONE ENCOUNTER
Please call patient with the following info:    Just heard back from the ortho consult. Based on most recent labs and his previous knee aspiration it doesn't appear to be an infected joint. Ortho believes it sound more consistent with an outer skin infection, cellulitis. Have his sxs improved? Ortho recommended an Infectious Disease consult if sxs not improving

## 2022-07-14 NOTE — TELEPHONE ENCOUNTER
Second attempt to call patient, no answer. Left voicemail and requested patient call back at [phone].     Teresa Blood RN   [facility]

## 2022-07-15 RX ORDER — CEPHALEXIN 500 MG/1
500 CAPSULE ORAL 4 TIMES DAILY
Qty: 40 CAPSULE | Refills: 0 | Status: SHIPPED | OUTPATIENT
Start: 2022-07-15 | End: 2022-07-25

## 2022-07-15 RX ORDER — SULFAMETHOXAZOLE/TRIMETHOPRIM 800-160 MG
1 TABLET ORAL 2 TIMES DAILY
Qty: 20 TABLET | Refills: 0 | Status: SHIPPED | OUTPATIENT
Start: 2022-07-15 | End: 2022-07-25

## 2022-07-15 NOTE — TELEPHONE ENCOUNTER
Patient calling, states that he was unsure if he should be taking both antibiotics prescribed as he was told by the pharmacy that they are similar. Advised patient that Jayla Trevino, PAC has recommended restarting both antibiotics. Confirmed administration instructions with patient. No further questions at this time.     Teresa Blood RN   [facility]

## 2022-07-15 NOTE — TELEPHONE ENCOUNTER
I would suggest restarting two oral antibiotics: Keflex and Bactrim. And putting in a referral to ID for further evaluation. Prescription sent, referral placed. Follow up with me in 1 week, virtual ok, to reassess

## 2022-07-15 NOTE — TELEPHONE ENCOUNTER
Patient notified of provider's message as written. Patient verbalized understanding. Advised patient to call and schedule appointment with ID at [phone] if he does not hear from their scheduling department within the next 2 days. Patient was also scheduled for telephone visit (he states that he does not have access to a camera for a video visit) on 7/21/22.     Teresa Blood RN   [facility]

## 2022-07-15 NOTE — TELEPHONE ENCOUNTER
Received call from patient. Relayed message by Jayla Trevino PA-C below.    He states that his symptoms are worsening since OV 07/06/2022. He reports his entire R leg is swollen, it is hard to walk on it. No more draining fluid from R knee- it is closed up but still keeping it wrapped. He reports his rash has almost completely resolved. He is inquiring about next steps and is frustrated because he is unable to go to work d/t pain.    Routing to provider to please advise- it appears as though ID referral is next step (cellulitis?).    Home #: [phone]  Please do not leave detailed voicemail on this line    Cell #: [phone]  Okay to leave detailed voicemail on this line    HARRIET SlaughterN JULIETH  [facility], [facility]

## 2022-07-21 ENCOUNTER — VIRTUAL VISIT (OUTPATIENT)
Dept: FAMILY MEDICINE | Facility: CLINIC | Age: 62
End: 2022-07-21
Payer: COMMERCIAL

## 2022-07-21 DIAGNOSIS — R60.0 EDEMA OF RIGHT LOWER LEG: Primary | ICD-10-CM

## 2022-07-21 DIAGNOSIS — L03.115 CELLULITIS OF RIGHT KNEE: ICD-10-CM

## 2022-07-21 PROCEDURE — 99213 OFFICE O/P EST LOW 20 MIN: CPT | Mod: TEL | Performed by: PHYSICIAN ASSISTANT

## 2022-07-21 RX ORDER — FUROSEMIDE 20 MG
TABLET ORAL
Qty: 7 TABLET | Refills: 0 | Status: SHIPPED | OUTPATIENT
Start: 2022-07-21 | End: 2023-10-24

## 2022-07-21 NOTE — PROGRESS NOTES
"Bryn is a 62 year old who is being evaluated via a billable telephone visit.      What phone number would you like to be contacted at? [phone]  How would you like to obtain your AVS? MyChart    Assessment & Plan     1. Edema of right lower leg    2. Cellulitis of right knee        1,2) Will start Lasix x5 days. I asked him to call ID and schedule a consult. Continue KEflex and BActrim DS, has 5 days left of each. Continue to elevate leg in the evenings and on the weekends.      Prescription drug management       BMI:   Estimated body mass index is 29.88 kg/m  as calculated from the following:    Height as of 7/6/22: 1.727 m (5' 8\").    Weight as of 7/6/22: 89.1 kg (196 lb 8 oz).       Return in about 1 week (around 7/28/2022), or if symptoms worsen or fail to improve.    Jayla Trevino PA-C  [facility]    Italo Clemente is a 62 year old, presenting for the following health issues:  RECHECK      HPI     Concern - Follow up    Description: Follow up for R leg cellulitis. Increased pain with use and movement. Pt reports drainage which relieved some pressure and improved pain.  Intensity: moderate  Progression of Symptoms:  intermittent    Knee swelling is better  Still warm, and red  Drainage had resolved but possibly drained yesterday - feels better today  Toes/feet are still swollen  Less than 50% improvement overall  Swelling worsens throughout the day  Rested with his leg elevated 3 days in a row and his leg looked normal after those 3 days      Review of Systems   Constitutional, cardiovascular, MSK, skin systems are negative, except as otherwise noted.      Objective           Vitals:  No vitals were obtained today due to virtual visit.    Physical Exam   healthy, alert and no distress  PSYCH: Alert and oriented times 3; coherent speech, normal   rate and volume, able to articulate logical thoughts, able   to abstract reason, no tangential thoughts, no hallucinations   or " delusions  His affect is normal and pleasant  RESP: No cough, no audible wheezing, able to talk in full sentences  Remainder of exam unable to be completed due to telephone visits          Phone call duration: 10 minutes    .  ..

## 2022-08-11 NOTE — TELEPHONE ENCOUNTER
RECORDS RECEIVED FROM: Internal   DATE RECEIVED: 09.12.2022   NOTES (Gather within 2 years) STATUS DETAILS   OFFICE NOTE from referring provider   Internal 07.13.2022 Jayla Trevino PA-C   OFFICE NOTE from other specialist Internal  07.12.2022 Tawanda Hardwick DO     DISCHARGE SUMMARY from hospital     DISCHARGE REPORT from the ER Internal  06.16.2022 Tawanda Ridley MD     LABS (any labs) Internal    MEDICATION LIST Internal    IMAGING  (NEED IMAGES AND REPORTS)     Osteomyelitis: Foot imaging      Liver Abscess: Abdominal imaging     Other (anything related to diagnoses

## 2022-08-15 ENCOUNTER — TELEPHONE (OUTPATIENT)
Dept: FAMILY MEDICINE | Facility: CLINIC | Age: 62
End: 2022-08-15

## 2022-08-15 DIAGNOSIS — M25.559 HIP PAIN: Primary | ICD-10-CM

## 2022-08-15 NOTE — TELEPHONE ENCOUNTER
Patient requesting referral for orthopedics for bilateral leg pain and hip pain. Referral pended.

## 2022-09-12 ENCOUNTER — PRE VISIT (OUTPATIENT)
Dept: INFECTIOUS DISEASES | Facility: CLINIC | Age: 62
End: 2022-09-12

## 2022-09-12 ENCOUNTER — OFFICE VISIT (OUTPATIENT)
Dept: ORTHOPEDICS | Facility: CLINIC | Age: 62
End: 2022-09-12
Attending: PHYSICIAN ASSISTANT
Payer: COMMERCIAL

## 2022-09-12 VITALS
DIASTOLIC BLOOD PRESSURE: 70 MMHG | BODY MASS INDEX: 29.8 KG/M2 | SYSTOLIC BLOOD PRESSURE: 142 MMHG | HEART RATE: 65 BPM | WEIGHT: 196 LBS

## 2022-09-12 DIAGNOSIS — M25.559 HIP PAIN: Primary | ICD-10-CM

## 2022-09-12 DIAGNOSIS — M16.0 ARTHRITIS OF BOTH HIPS: ICD-10-CM

## 2022-09-12 PROCEDURE — 99243 OFF/OP CNSLTJ NEW/EST LOW 30: CPT | Performed by: PEDIATRICS

## 2022-09-12 NOTE — LETTER
9/12/2022         RE: Darnell Wick  [address] [place] [id number]        Dear Colleague,    Thank you for referring your patient, Darnell Wick, to the [facility]IN[facility] [place]. Please see a copy of my visit note below.    ASSESSMENT & PLAN    Darnell was seen today for pain.    Diagnoses and all orders for this visit:    Hip pain  -     Orthopedic  Referral  -     XR Lumbar Spine 2/3 Views  -     XR Pelvis w Hip LT 1 View  -     Orthopedic  Referral; Future    Arthritis of both hips  -     Orthopedic  Referral; Future      This issue is chronic and Unchanged.    Discussed nature of degenerative arthritis of the hip. Discussed symptom treatment with over-the-counter medications and rest if needed. Discussed benefits of possible physical therapy. Discussed injection therapy. Also briefly discussed future consideration of referral to orthopedic surgery for further evaluation and discussion of arthroplasty.     Plan:  - Today's Plan of Care:  Referral for US guided left hip injection  Discussed activity considerations and other supportive care, OTC and other topical medications as needed.    -We also discussed other future treatment options:  Referral to Orthopedic Surgery  Referral to Physical Therapy    Follow Up: as needed  - Would follow up with PCP given persistent right leg swelling    Concerning signs and symptoms were reviewed.  The patient expressed understanding of this management plan and all questions were answered at this time.    Thanks for the opportunity to participate in the care of this patient, I will keep you updated on their progress.    CC: Jayla Olsen MD [facility]  Sports Medicine Physician  [facility]    -----  Chief Complaint   Patient presents with     Left Hip - Pain       SUBJECTIVE  Darnell Wick is a/an 62 year old male who is seen in consultation at the request of  Jayla Trevino PA-C for evaluation of left hip pain.     The patient is seen by themselves.    Onset: 1 month(s) ago. Reports insidious onset without acute precipitating event.  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    REVIEW OF SYSTEMS:  Review of Systems  Skin: no bruising, no swelling  Musculoskeletal: as above  Neurologic: no numbness, paresthesias  Remainder of review of systems is negative including constitutional, CV, pulmonary, GI, except as noted in HPI or medical history.    OBJECTIVE:  BP (!) 142/70   Pulse 65   Wt 88.9 kg (196 lb)   BMI 29.80 kg/m     General: healthy, alert and in no distress  HEENT: no scleral icterus or conjunctival erythema  Skin: no suspicious lesions or rash. No jaundice.  CV: distal perfusion intact  Resp: normal respiratory effort without conversational dyspnea   Psych: normal mood and affect  Gait: normal steady gait with appropriate coordination and balance  Neuro: Normal light sensory exam of lower extremity    Bilateral hip exam  Inspection:      no edema or ecchymosis in hip area    - does have some right leg swelling    Tender:      none bilateral       Points to groin    Non Tender:      remainder of the hip area bilateral    ROM:     Slightly limited ROM on left hip compare to right    Strength:      flexion 4/5 left       abduction 5/5 bilateral       adduction 5/5  bilateral    Sensation:      grossly intact in hip and thigh    Special Tests:      Some pain on the left with with NATHAN and FADIR    RADIOLOGY:  I independently ordered, visualized and reviewed these images with the patient  AP Pelvis and  XR views of left hip reviewed: moderate arthritis both hips, worse on the left  - will follow official read    Reviewed Prior Right leg work up including US 6/13/2022 without DVT or blood clot    Review of the result(s) of each unique test - XRs             Again, thank you for allowing me to participate in the care of your patient.        Sincerely,        Marina Olsen MD

## 2022-09-12 NOTE — Clinical Note
Recommended follow up with you given persistent right leg swelling. Pelvic XR also shows vascular calcifications. Thanks, and let me know if questions.

## 2022-09-12 NOTE — PROGRESS NOTES
ASSESSMENT & PLAN    Darnell was seen today for pain.    Diagnoses and all orders for this visit:    Hip pain  -     Orthopedic  Referral  -     XR Lumbar Spine 2/3 Views  -     XR Pelvis w Hip LT 1 View  -     Orthopedic  Referral; Future    Arthritis of both hips  -     Orthopedic  Referral; Future      This issue is chronic and Unchanged.    Discussed nature of degenerative arthritis of the hip. Discussed symptom treatment with over-the-counter medications and rest if needed. Discussed benefits of possible physical therapy. Discussed injection therapy. Also briefly discussed future consideration of referral to orthopedic surgery for further evaluation and discussion of arthroplasty.     Plan:  - Today's Plan of Care:  Referral for US guided left hip injection  Discussed activity considerations and other supportive care, OTC and other topical medications as needed.    -We also discussed other future treatment options:  Referral to Orthopedic Surgery  Referral to Physical Therapy    Follow Up: as needed  - Would follow up with PCP given persistent right leg swelling    Concerning signs and symptoms were reviewed.  The patient expressed understanding of this management plan and all questions were answered at this time.    Thanks for the opportunity to participate in the care of this patient, I will keep you updated on their progress.    CC: Jayla Olsen MD [facility]  Sports Medicine Physician  [facility]    -----  Chief Complaint   Patient presents with     Left Hip - Pain       SUBJECTIVE  Darnell Wick is a/an 62 year old male who is seen in consultation at the request of Jayla Trevino PA-C for evaluation of left hip pain.     The patient is seen by themselves.    Onset: 1 month(s) ago. Reports insidious onset without acute precipitating event.  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    REVIEW OF SYSTEMS:  Review of Systems  Skin: no bruising, no swelling  Musculoskeletal: as above  Neurologic: no numbness, paresthesias  Remainder of review of systems is negative including constitutional, CV, pulmonary, GI, except as noted in HPI or medical history.    OBJECTIVE:  BP (!) 142/70   Pulse 65   Wt 88.9 kg (196 lb)   BMI 29.80 kg/m     General: healthy, alert and in no distress  HEENT: no scleral icterus or conjunctival erythema  Skin: no suspicious lesions or rash. No jaundice.  CV: distal perfusion intact  Resp: normal respiratory effort without conversational dyspnea   Psych: normal mood and affect  Gait: normal steady gait with appropriate coordination and balance  Neuro: Normal light sensory exam of lower extremity    Bilateral hip exam  Inspection:      no edema or ecchymosis in hip area    - does have some right leg swelling    Tender:      none bilateral       Points to groin    Non Tender:      remainder of the hip area bilateral    ROM:     Slightly limited ROM on left hip compare to right    Strength:      flexion 4/5 left       abduction 5/5 bilateral       adduction 5/5 bilateral    Sensation:      grossly intact in hip and thigh    Special Tests:      Some pain on the left with with NATHAN and FADIR    RADIOLOGY:  I independently ordered, visualized and reviewed these images with the patient  AP Pelvis and  XR views of left hip reviewed: moderate  arthritis both hips, worse on the left  - will follow official read    3 XR views of lumbar spine reviewed: no acute bony abnormality, degenerative changes throughout lumbar spine  - will follow official read      Reviewed Prior Right leg work up including US 6/13/2022 without DVT or blood clot    Review of the result(s) of each unique test - XRs

## 2022-09-12 NOTE — PATIENT INSTRUCTIONS
Discussed nature of degenerative arthritis of the hip. Discussed symptom treatment with over-the-counter medications and rest if needed. Discussed benefits of possible physical therapy. Discussed injection therapy. Also briefly discussed future consideration of referral to orthopedic surgery for further evaluation and discussion of arthroplasty.     Plan:  - Today's Plan of Care:  Referral for US guided left hip injection  Discussed activity considerations and other supportive care, OTC and other topical medications as needed.    -We also discussed other future treatment options:  Referral to Orthopedic Surgery  Referral to Physical Therapy    Follow Up: as needed  - Would follow up with PCP given persistent right leg swelling    If you have any further questions for your physician or physician s care team you can call [phone] and use option 3 to leave a voice message.

## 2022-09-17 ENCOUNTER — TELEPHONE (OUTPATIENT)
Dept: ORTHOPEDICS | Facility: CLINIC | Age: 62
End: 2022-09-17

## 2022-09-17 NOTE — LETTER
September 26, 2022      Darnell Wick  [address] [place] [id number]        Dear Andie Patrick we have missed you in clinic and on the telephone, so we are reaching out to you in a letter to let you know the following information regarding your XRay.  Dr. Olsen is reading your XRay and consulting with another physician as well. Given extensive arterial calcifications noted by Radiology, you should schedule a follow up with PCP Jayla Trevino to discuss if further work up is needed.  Your XRay results are forwarded to Jayla Trevino PAC as well.  Please let us know if you have any further questions or concerns.     Ph. [phone]       Sincerely,        Marina Olsen MD

## 2022-09-17 NOTE — TELEPHONE ENCOUNTER
Please call patient with XR results:    XR Pelvis w Hip LT 1 View  Result Date: 9/12/2022  EXAM: XR PELVIS AND HIP LEFT 1 VIEW LOCATION: [facility] [place] DATE/TIME: 9/12/2022 4:47 PM INDICATION: Bilateral radiating leg pain, hip pain. COMPARISON: None.   IMPRESSION: Mild to moderate degenerative changes in the visualized lumbar spine. Moderate osteoarthrosis in both hips. There is no evidence of fracture or osteonecrosis. Extensive arterial calcifications.    In Summary:  - Given extensive arterial calcifications noted by Radiology, Ray should schedule a follow up with PCP Jayla Trevino to discuss if further work up is needed.  - I did forward her there results    Marina Olsen MD

## 2022-09-19 ENCOUNTER — TELEPHONE (OUTPATIENT)
Dept: FAMILY MEDICINE | Facility: CLINIC | Age: 62
End: 2022-09-19

## 2022-09-19 DIAGNOSIS — M79.89 RIGHT LEG SWELLING: Primary | ICD-10-CM

## 2022-09-19 NOTE — TELEPHONE ENCOUNTER
"Called patient, LVM regarding his XR read and the need for f/u with his PCP.      Of note:   \"In Summary:  - Given extensive arterial calcifications noted by Radiology, Ray should schedule a follow up with PCP Jayla Trevino to discuss if further work up is needed.  - I did forward her there results\"    Sarai Montoya ATC, CSCS  Dr. Olsen's Clinical Coordinator  [facility] Sports Medicine Team    "

## 2022-09-19 NOTE — TELEPHONE ENCOUNTER
Please call patient with the following info:    Ortho mentioned his lower leg swelling has persisted. Have his sxs changed at all since we last followed up in July?

## 2022-09-20 NOTE — TELEPHONE ENCOUNTER
Called [phone] (home)     Did patient answer the phone: No, left a message on voicemail to return call to the [facility] at [phone].    Nadiya RN,BSN  Triage Nurse  [facility]: [facility]

## 2022-09-26 NOTE — TELEPHONE ENCOUNTER
I called and spoke to patient, he says the right lower leg edema is the same, he says the lasix did not help in July.    He says the red is still discolored but not swollen.    He is frustrated that his injection tomorrow is for only the left hip, says both hips hurt.    I advised he call the pain clinic to see if he could come in early to discuss having both hips injected?    He states that would be okay, he does not have the number to call.    I transferred his call to pain clinic to discuss this possibility.    Routed this encounter to Jayla Trevino, per patient, swelling is the same to lower right leg, no new symptoms.    Sumaya Cates RN  [facility]

## 2022-09-26 NOTE — TELEPHONE ENCOUNTER
Letter is sent to patient regarding this information.    Sarai Montoya ATC, CSCS  Dr. Olsen's Clinical Coordinator  [facility] Medicine Team

## 2022-09-26 NOTE — TELEPHONE ENCOUNTER
It could be a vascular/vein problem. I'd like him to follow up with one of vascular specialists for further evaluation, referral placed

## 2022-09-27 ENCOUNTER — TELEPHONE (OUTPATIENT)
Dept: VASCULAR SURGERY | Facility: CLINIC | Age: 62
End: 2022-09-27

## 2022-09-27 ENCOUNTER — OFFICE VISIT (OUTPATIENT)
Dept: ORTHOPEDICS | Facility: CLINIC | Age: 62
End: 2022-09-27
Attending: PEDIATRICS
Payer: COMMERCIAL

## 2022-09-27 DIAGNOSIS — M25.559 HIP PAIN: ICD-10-CM

## 2022-09-27 DIAGNOSIS — M16.0 ARTHRITIS OF BOTH HIPS: Primary | ICD-10-CM

## 2022-09-27 PROCEDURE — 20611 DRAIN/INJ JOINT/BURSA W/US: CPT | Mod: 50 | Performed by: FAMILY MEDICINE

## 2022-09-27 RX ADMIN — BETAMETHASONE SODIUM PHOSPHATE AND BETAMETHASONE ACETATE 6 MG: 3; 3 INJECTION, SUSPENSION INTRA-ARTICULAR; INTRALESIONAL; INTRAMUSCULAR; SOFT TISSUE at 15:56

## 2022-09-27 RX ADMIN — ROPIVACAINE HYDROCHLORIDE 2 ML: 5 INJECTION, SOLUTION EPIDURAL; INFILTRATION; PERINEURAL at 15:56

## 2022-09-27 NOTE — PATIENT INSTRUCTIONS
[facility] Injection Discharge Instructions    Procedure: Bilateral Hip Joint Steroid Injections    Follow-up: As needed can repeat steroid injections every 3+ months   You may shower, however avoid swimming, tub baths or hot tubs for 24 hours following your procedure  You may have a mild to moderate increase in pain for several days following the injection.  It may take up to 14 days for the steroid medication to start working although you may feel the effect as early as a few days after the procedure.  You may use ice packs for 10-15 minutes, 3 to 4 times a day at the injection site for comfort  You may use anti-inflammatory medications (such as Ibuprofen or Aleve or Advil) or Tylenol for pain control if necessary  If you were fasting, you may resume your normal diet and medications after the procedure  If you have diabetes, check your blood sugar more frequently than usual as your blood sugar may be higher than normal for 10-14 days following a steroid injection. Contact your doctor who manages your diabetes if your blood sugar is higher than usual    If you experience any of the following, call [facility] @ [phone] or [phone]  -Fever over 100 degree F  -Swelling, bleeding, redness, drainage, warmth at the injection site  - New or worsening pain    It was great seeing you today!    Manuel Stanley

## 2022-09-27 NOTE — LETTER
9/27/2022         RE: Darnell Wick  [address] [place] [phone]        Dear Colleague,    Thank you for referring your patient, Darnell Wick, to the [facility] [place]. Please see a copy of my visit note below.    Large Joint Injection/Arthocentesis: bilateral hip joint    Date/Time: 9/27/2022 3:56 PM  Performed by: Manuel Stanley MD  Authorized by: Manuel Stanley MD     Indications:  Pain  Needle Size:  22 G  Guidance: ultrasound    Approach:  Anterior  Location:  Hip  Laterality:  Bilateral      Site:  Bilateral hip joint  Medications (Right):  6 mg betamethasone acet & sod phos 6 (3-3) MG/ML; 2 mL ropivacaine 5 MG/ML  Medications (Left):  6 mg betamethasone acet & sod phos 6 (3-3) MG/ML; 2 mL ropivacaine 5 MG/ML  Outcome:  Tolerated well, no immediate complications  Procedure discussed: discussed risks, benefits, and alternatives    Consent Given by:  Patient  Timeout: timeout called immediately prior to procedure    Prep: patient was prepped and draped in usual sterile fashion     Ultrasound images of procedure were permanently stored.   Referred by Dr. Olsen     Patient reported significant improvement of pain after the numbing portion bilateral hip joint steroid injections.  Ultrasound guided images were permanently stored.  Aftercare instructions given to patient.  Plan to follow-up as previously discussed with referring provider.     Manuel Stanley MD [facility]opedic Care              Again, thank you for allowing me to participate in the care of your patient.        Sincerely,        Manuel Stanley MD

## 2022-09-27 NOTE — PROGRESS NOTES
Large Joint Injection/Arthocentesis: bilateral hip joint    Date/Time: 9/27/2022 3:56 PM  Performed by: Manuel Stanley MD  Authorized by: Manuel Stanley MD     Indications:  Pain  Needle Size:  22 G  Guidance: ultrasound    Approach:  Anterior  Location:  Hip  Laterality:  Bilateral      Site:  Bilateral hip joint  Medications (Right):  6 mg betamethasone acet & sod phos 6 (3-3) MG/ML; 2 mL ropivacaine 5 MG/ML  Medications (Left):  6 mg betamethasone acet & sod phos 6 (3-3) MG/ML; 2 mL ropivacaine 5 MG/ML  Outcome:  Tolerated well, no immediate complications  Procedure discussed: discussed risks, benefits, and alternatives    Consent Given by:  Patient  Timeout: timeout called immediately prior to procedure    Prep: patient was prepped and draped in usual sterile fashion     Ultrasound images of procedure were permanently stored.   Referred by Dr. Olsen     Patient reported significant improvement of pain after the numbing portion bilateral hip joint steroid injections.  Ultrasound guided images were permanently stored.  Aftercare instructions given to patient.  Plan to follow-up as previously discussed with referring provider.     Manuel Stanley MD [facility] Orthopedic [facility]

## 2022-09-27 NOTE — TELEPHONE ENCOUNTER
I see contact information out of referral:     [employer]   [address] [facility] [address]   Phone: [phone]     Attempted to call patient at home number, no answer, left message on voicemail; patient was instructed to return call to [facility] at [phone].    Sumaya Cates RN  [facility]

## 2022-09-28 NOTE — TELEPHONE ENCOUNTER
Spoke with patient regarding message below; patient verbalized understanding and will call for appointment.  Left information for referral on patient's mobile voice mail per patient request.  Keerthi Son RN

## 2022-09-29 RX ORDER — BETAMETHASONE SODIUM PHOSPHATE AND BETAMETHASONE ACETATE 3; 3 MG/ML; MG/ML
6 INJECTION, SUSPENSION INTRA-ARTICULAR; INTRALESIONAL; INTRAMUSCULAR; SOFT TISSUE
Status: DISCONTINUED | OUTPATIENT
Start: 2022-09-27 | End: 2023-01-12

## 2022-09-29 RX ORDER — ROPIVACAINE HYDROCHLORIDE 5 MG/ML
2 INJECTION, SOLUTION EPIDURAL; INFILTRATION; PERINEURAL
Status: DISCONTINUED | OUTPATIENT
Start: 2022-09-27 | End: 2023-01-12

## 2022-12-01 DIAGNOSIS — I10 HYPERTENSION, GOAL BELOW 140/90: ICD-10-CM

## 2022-12-01 DIAGNOSIS — M10.09 IDIOPATHIC GOUT OF MULTIPLE SITES, UNSPECIFIED CHRONICITY: ICD-10-CM

## 2022-12-02 RX ORDER — LOSARTAN POTASSIUM 100 MG/1
TABLET ORAL
Qty: 60 TABLET | Refills: 0 | Status: SHIPPED | OUTPATIENT
Start: 2022-12-02 | End: 2023-02-22

## 2022-12-02 RX ORDER — AMLODIPINE BESYLATE 10 MG/1
TABLET ORAL
Qty: 60 TABLET | Refills: 0 | Status: SHIPPED | OUTPATIENT
Start: 2022-12-02 | End: 2023-02-22

## 2022-12-02 RX ORDER — ALLOPURINOL 100 MG/1
TABLET ORAL
Qty: 60 TABLET | Refills: 0 | Status: SHIPPED | OUTPATIENT
Start: 2022-12-02 | End: 2023-02-22

## 2022-12-26 ENCOUNTER — TELEPHONE (OUTPATIENT)
Dept: FAMILY MEDICINE | Facility: CLINIC | Age: 62
End: 2022-12-26

## 2023-01-12 ENCOUNTER — ANCILLARY PROCEDURE (OUTPATIENT)
Dept: GENERAL RADIOLOGY | Facility: CLINIC | Age: 63
End: 2023-01-12
Attending: FAMILY MEDICINE
Payer: COMMERCIAL

## 2023-01-12 ENCOUNTER — OFFICE VISIT (OUTPATIENT)
Dept: ORTHOPEDICS | Facility: CLINIC | Age: 63
End: 2023-01-12
Payer: COMMERCIAL

## 2023-01-12 VITALS — BODY MASS INDEX: 29.32 KG/M2 | DIASTOLIC BLOOD PRESSURE: 78 MMHG | SYSTOLIC BLOOD PRESSURE: 122 MMHG | WEIGHT: 192.8 LBS

## 2023-01-12 DIAGNOSIS — M25.562 ACUTE PAIN OF LEFT KNEE: Primary | ICD-10-CM

## 2023-01-12 DIAGNOSIS — M25.562 LEFT KNEE PAIN: ICD-10-CM

## 2023-01-12 PROCEDURE — 73562 X-RAY EXAM OF KNEE 3: CPT | Mod: TC | Performed by: RADIOLOGY

## 2023-01-12 PROCEDURE — 20611 DRAIN/INJ JOINT/BURSA W/US: CPT | Mod: LT | Performed by: FAMILY MEDICINE

## 2023-01-12 PROCEDURE — 99213 OFFICE O/P EST LOW 20 MIN: CPT | Mod: 25 | Performed by: FAMILY MEDICINE

## 2023-01-12 RX ADMIN — BETAMETHASONE SODIUM PHOSPHATE AND BETAMETHASONE ACETATE 6 MG: 3; 3 INJECTION, SUSPENSION INTRA-ARTICULAR; INTRALESIONAL; INTRAMUSCULAR; SOFT TISSUE at 15:34

## 2023-01-12 RX ADMIN — ROPIVACAINE HYDROCHLORIDE 4 ML: 5 INJECTION, SOLUTION EPIDURAL; INFILTRATION; PERINEURAL at 15:34

## 2023-01-12 NOTE — PROGRESS NOTES
ASSESSMENT & PLAN    Darnell was seen today for pain.    Diagnoses and all orders for this visit:    Left knee pain  -     XR Knee Standing AP [place] Bilat Lat Left; Future  -     Large Joint Injection/Arthocentesis: L knee joint      This issue is acute and Worsening.    # Left Knee Pain: Darnell Wick  was seen today for left knee pain. Symptoms had been going on for 1 week. On examination there are positive findings of tenderness to palpation over the medial joint line. Imaging findings showed no significant arthritis. Likely cause of patient's condition due to mild arthritis in knee not seen on x-rays. Counseled patient on nature of condition and treatment options.  Given this plan as below, follow-up as needed     Image Findings: left knee x-rays negative for arthritis  Treatment: Activities as tolerated, home exercises given today  Job: As tolerated  Medications/Injections: Limited tylenol/ibuprofen for pain for 1-2 weeks, left knee joint steroid injection  Follow-up: In one month if symptoms do not improve, sooner if worsening  Can consider further evaluation, MRI vs PT    Manuel Stanley MD  [facility] SPORTS MEDICINE [facility]    -----  Chief Complaint   Patient presents with     Left Knee - Pain       SUBJECTIVE  Darnell Wick is a/an 62 year old male who is seen as a self referral for evaluation of left knee pain.     The patient is seen by themselves.      Onset: 1 week(s) ago. Reports insidious onset without acute precipitating event. Knees swollen.   Previous hip injections only lasted one day  Location of Pain: left anterior knee pain  Worsened by: standing   Better with: nothing   Treatments tried: rest/activity avoidance, elevation and biofreeze  Associated symptoms: swelling    Orthopedic/Surgical history: YES -chronic knee pain   Social History/Occupation: cut material     No family history pertinent to patient's problem today.      REVIEW OF SYSTEMS:  Review of  Systems  Constitutional, HEENT, cardiovascular, pulmonary, gi and gu systems are negative, except as otherwise noted.    OBJECTIVE:  /78   Wt 87.5 kg (192 lb 12.8 oz)   BMI 29.32 kg/m     General: healthy, alert and in no distress  HEENT: no scleral icterus or conjunctival erythema  Skin: no suspicious lesions or rash. No jaundice.  CV: distal perfusion intact    Resp: normal respiratory effort without conversational dyspnea   Psych: normal mood and affect  Gait: normal steady gait with appropriate coordination and balance   Neuro: Normal light sensory exam of left lower extremity     Ortho Exam   LEFT KNEE  Inspection:    Normal alignment; no edema, erythema, or ecchymosis present  Palpation:    Tender about the lateral joint line and medial joint line. Remainder of bony and ligamentous landmarks are nontender.    Mild effusion is present    Patellofemoral crepitus is Absent  Range of Motion:     00 extension to 1350 flexion  Strength:    Quadriceps 5/5, hamstrings 5/5, gastrocsoleus 5/5 and tibialis anterior 5/5    Extensor mechanism intact  Special Tests:    Positive: None    Negative: Patellar grind, MCL/valgus stress (0 & 30 deg), LCL/varus stress (0 & 30 deg), Lachman's, anterior drawer, posterior drawer, Cynthia's      RADIOLOGY:  I independently ordered, visualized and reviewed these images with the patient                                                                     IMPRESSION:      LEFT KNEE: Normal joint spaces and alignment. No acute fracture or  joint effusion.     RIGHT KNEE: Frontal and sunrise views of the right knee demonstrate  preserved joint spaces.     JUDD JULES MD            Review of external notes as documented elsewhere in note  Review of the result(s) of each unique test - left knee x-rays       Disclaimer: This note consists of symbols derived from keyboarding, dictation and/or voice recognition software. As a result, there may be errors in the script that have gone  undetected. Please consider this when interpreting information found in this chart.    Large Joint Injection/Arthocentesis: L knee joint    Date/Time: 1/12/2023 3:34 PM  Performed by: Manuel Stanley MD  Authorized by: Manuel Stanley MD     Indications:  Pain and osteoarthritis  Needle Size:  25 G  Guidance: ultrasound    Approach:  Superolateral  Location:  Knee      Medications:  6 mg betamethasone acet & sod phos 6 (3-3) MG/ML; 4 mL ropivacaine 5 MG/ML  Outcome:  Tolerated well, no immediate complications  Procedure discussed: discussed risks, benefits, and alternatives    Consent Given by:  Patient  Timeout: timeout called immediately prior to procedure    Prep: patient was prepped and draped in usual sterile fashion     Ultrasound images of procedure were permanently stored.     Patient reported some improvement of pain after the numbing portion left knee joint steroid injection.  Ultrasound guided images were permanently stored.   Aftercare instructions given to patient.  Plan to follow-up as discussed above.     Manuel Stanley MD [facility] and Orthopedic [facility]

## 2023-01-12 NOTE — LETTER
1/12/2023         RE: Darnell Wick  [address] [address]        Dear Colleague,    Thank you for referring your patient, Darnell Wick, to the [facility]. Please see a copy of my visit note below.    ASSESSMENT & PLAN    Darnell was seen today for pain.    Diagnoses and all orders for this visit:    Left knee pain  -     XR Knee Standing AP [place] Bilat Lat Left; Future  -     Large Joint Injection/Arthocentesis: L knee joint      This issue is acute and Worsening.    # Left Knee Pain: Darnell Wick  was seen today for left knee pain. Symptoms had been going on for 1 week. On examination there are positive findings of tenderness to palpation over the medial joint line. Imaging findings showed no significant arthritis. Likely cause of patient's condition due to mild arthritis in knee not seen on x-rays. Counseled patient on nature of condition and treatment options.  Given this plan as below, follow-up as needed     Image Findings: left knee x-rays negative for arthritis  Treatment: Activities as tolerated, home exercises given today  Job: As tolerated  Medications/Injections: Limited tylenol/ibuprofen for pain for 1-2 weeks, left knee joint steroid injection  Follow-up: In one month if symptoms do not improve, sooner if worsening  Can consider further evaluation, MRI vs PT    Manuel Stanley MD  [facility]    -----  Chief Complaint   Patient presents with     Left Knee - Pain       SUBJECTIVE  Darnell Wick is a/an 62 year old male who is seen as a self referral for evaluation of left knee pain.     The patient is seen by themselves.      Onset: 1 week(s) ago. Reports insidious onset without acute precipitating event. Knees swollen.   Previous hip injections only lasted one day  Location of Pain: left anterior knee pain  Worsened by: standing   Better with: nothing   Treatments tried: rest/activity  avoidance, elevation and biofreeze  Associated symptoms: swelling    Orthopedic/Surgical history: YES -chronic knee pain   Social History/Occupation: cut material     No family history pertinent to patient's problem today.      REVIEW OF SYSTEMS:  Review of Systems  Constitutional, HEENT, cardiovascular, pulmonary, gi and gu systems are negative, except as otherwise noted.    OBJECTIVE:  /78   Wt 87.5 kg (192 lb 12.8 oz)   BMI 29.32 kg/m     General: healthy, alert and in no distress  HEENT: no scleral icterus or conjunctival erythema  Skin: no suspicious lesions or rash. No jaundice.  CV: distal perfusion intact    Resp: normal respiratory effort without conversational dyspnea   Psych: normal mood and affect  Gait: normal steady gait with appropriate coordination and balance   Neuro: Normal light sensory exam of left lower extremity     Ortho Exam   LEFT KNEE  Inspection:    Normal alignment; no edema, erythema, or ecchymosis present  Palpation:    Tender about the lateral joint line and medial joint line. Remainder of bony and ligamentous landmarks are nontender.    Mild effusion is present    Patellofemoral crepitus is Absent  Range of Motion:     00 extension to 1350 flexion  Strength:    Quadriceps 5/5, hamstrings 5/5, gastrocsoleus 5/5 and tibialis anterior 5/5    Extensor mechanism intact  Special Tests:    Positive: None    Negative: Patellar grind, MCL/valgus stress (0 & 30 deg), LCL/varus stress (0 & 30 deg), Lachman's, anterior drawer, posterior drawer, Cynthia's      RADIOLOGY:  I independently ordered, visualized and reviewed these images with the patient                                                                     IMPRESSION:      LEFT KNEE: Normal joint spaces and alignment. No acute fracture or  joint effusion.     RIGHT KNEE: Frontal and sunrise views of the right knee demonstrate  preserved joint spaces.     JUDD JULES MD            Review of external notes as documented  elsewhere in note  Review of the result(s) of each unique test - left knee x-rays       Disclaimer: This note consists of symbols derived from keyboarding, dictation and/or voice recognition software. As a result, there may be errors in the script that have gone undetected. Please consider this when interpreting information found in this chart.    Large Joint Injection/Arthocentesis: L knee joint    Date/Time: 1/12/2023 3:34 PM  Performed by: Manuel Stanley MD  Authorized by: Manuel Stanley MD     Indications:  Pain and osteoarthritis  Needle Size:  25 G  Guidance: ultrasound    Approach:  Superolateral  Location:  Knee      Medications:  6 mg betamethasone acet & sod phos 6 (3-3) MG/ML; 4 mL ropivacaine 5 MG/ML  Outcome:  Tolerated well, no immediate complications  Procedure discussed: discussed risks, benefits, and alternatives    Consent Given by:  Patient  Timeout: timeout called immediately prior to procedure    Prep: patient was prepped and draped in usual sterile fashion     Ultrasound images of procedure were permanently stored.     Patient reported some improvement of pain after the numbing portion left knee joint steroid injection.  Ultrasound guided images were permanently stored.   Aftercare instructions given to patient.  Plan to follow-up as discussed above.     Manuel Stanley MD [facility]              Again, thank you for allowing me to participate in the care of your patient.        Sincerely,        Manuel Stanley MD

## 2023-01-12 NOTE — PATIENT INSTRUCTIONS
# Left Knee Pain: Darnell Wick  was seen today for left knee pain. Symptoms had been going on for 1 week. On examination there are positive findings of tenderness to palpation over the medial joint line. Imaging findings showed no significant arthritis. Likely cause of patient's condition due to mild arthritis in knee not seen on x-rays. Counseled patient on nature of condition and treatment options.  Given this plan as below, follow-up as needed     Image Findings: left knee x-rays negative for arthritis  Treatment: Activities as tolerated, home exercises given today  Job: As tolerated  Medications/Injections: Limited tylenol/ibuprofen for pain for 1-2 weeks, left knee joint steroid injection  Follow-up: In one month if symptoms do not improve, sooner if worsening  Can consider further evaluation, MRI vs PT    Please call [phone]   Ask for my team if you have any questions or concerns    If you have not yet received the influenza vaccine but would like to get one, please call  [phone] or you can schedule via [employer]    It was great seeing you again today!    Manuel Stanley MD, CAM     FS Injection Discharge Instructions    Procedure: left knee joint steroid injection    You may shower, however avoid swimming, tub baths or hot tubs for 24 hours following your procedure  You may have a mild to moderate increase in pain for several days following the injection.  It may take up to 14 days for the steroid medication to start working although you may feel the effect as early as a few days after the procedure.  You may use ice packs for 10-15 minutes, 3 to 4 times a day at the injection site for comfort  You may use anti-inflammatory medications (such as Ibuprofen or Aleve or Advil) or Tylenol for pain control if necessary  If you were fasting, you may resume your normal diet and medications after the procedure  If you have diabetes, check your blood sugar more frequently than usual as your blood  sugar may be higher than normal for 10-14 days following a steroid injection. Contact your doctor who manages your diabetes if your blood sugar is higher than usual    If you experience any of the following, call [facility] @ [phone] or [phone]  -Fever over 100 degree F  -Swelling, bleeding, redness, drainage, warmth at the injection site  - New or worsening pain

## 2023-01-16 RX ORDER — ROPIVACAINE HYDROCHLORIDE 5 MG/ML
4 INJECTION, SOLUTION EPIDURAL; INFILTRATION; PERINEURAL
Status: SHIPPED | OUTPATIENT
Start: 2023-01-12

## 2023-01-16 RX ORDER — BETAMETHASONE SODIUM PHOSPHATE AND BETAMETHASONE ACETATE 3; 3 MG/ML; MG/ML
6 INJECTION, SUSPENSION INTRA-ARTICULAR; INTRALESIONAL; INTRAMUSCULAR; SOFT TISSUE
Status: SHIPPED | OUTPATIENT
Start: 2023-01-12

## 2023-03-21 DIAGNOSIS — M10.09 IDIOPATHIC GOUT OF MULTIPLE SITES, UNSPECIFIED CHRONICITY: ICD-10-CM

## 2023-03-22 RX ORDER — ALLOPURINOL 100 MG/1
TABLET ORAL
Qty: 30 TABLET | Refills: 0 | Status: SHIPPED | OUTPATIENT
Start: 2023-03-22 | End: 2023-04-18

## 2023-05-24 DIAGNOSIS — I10 HYPERTENSION, GOAL BELOW 140/90: ICD-10-CM

## 2023-05-24 RX ORDER — AMLODIPINE BESYLATE 10 MG/1
TABLET ORAL
Qty: 90 TABLET | Refills: 0 | Status: SHIPPED | OUTPATIENT
Start: 2023-05-24 | End: 2023-08-28

## 2023-07-21 DIAGNOSIS — M10.09 IDIOPATHIC GOUT OF MULTIPLE SITES, UNSPECIFIED CHRONICITY: ICD-10-CM

## 2023-07-24 RX ORDER — ALLOPURINOL 100 MG/1
TABLET ORAL
Qty: 90 TABLET | Refills: 0 | OUTPATIENT
Start: 2023-07-24

## 2023-07-26 RX ORDER — ALLOPURINOL 100 MG/1
100 TABLET ORAL DAILY
Qty: 90 TABLET | Refills: 0 | Status: SHIPPED | OUTPATIENT
Start: 2023-07-26 | End: 2023-10-24

## 2023-07-26 NOTE — TELEPHONE ENCOUNTER
Patient calling  He has made an appointment with PCP for 10/23/2023 and is requesting a zeferino refill  Refill sent    Francisco Fisher RN  [facility]

## 2023-08-28 DIAGNOSIS — I10 HYPERTENSION, GOAL BELOW 140/90: ICD-10-CM

## 2023-08-28 RX ORDER — AMLODIPINE BESYLATE 10 MG/1
TABLET ORAL
Qty: 90 TABLET | Refills: 0 | Status: SHIPPED | OUTPATIENT
Start: 2023-08-28 | End: 2023-10-24

## 2023-09-05 ENCOUNTER — TRANSFERRED RECORDS (OUTPATIENT)
Dept: HEALTH INFORMATION MANAGEMENT | Facility: CLINIC | Age: 63
End: 2023-09-05
Payer: COMMERCIAL

## 2023-10-23 ENCOUNTER — LAB (OUTPATIENT)
Dept: FAMILY MEDICINE | Facility: CLINIC | Age: 63
End: 2023-10-23

## 2023-10-23 ENCOUNTER — OFFICE VISIT (OUTPATIENT)
Dept: FAMILY MEDICINE | Facility: CLINIC | Age: 63
End: 2023-10-23
Payer: COMMERCIAL

## 2023-10-23 VITALS
BODY MASS INDEX: 29.77 KG/M2 | OXYGEN SATURATION: 98 % | DIASTOLIC BLOOD PRESSURE: 60 MMHG | HEIGHT: 68 IN | SYSTOLIC BLOOD PRESSURE: 126 MMHG | TEMPERATURE: 97.1 F | HEART RATE: 84 BPM | WEIGHT: 196.4 LBS | RESPIRATION RATE: 16 BRPM

## 2023-10-23 DIAGNOSIS — I10 HYPERTENSION, GOAL BELOW 140/90: ICD-10-CM

## 2023-10-23 DIAGNOSIS — Z12.11 SCREEN FOR COLON CANCER: ICD-10-CM

## 2023-10-23 DIAGNOSIS — Z12.5 SCREENING FOR PROSTATE CANCER: ICD-10-CM

## 2023-10-23 DIAGNOSIS — Z13.1 DIABETES MELLITUS SCREENING: ICD-10-CM

## 2023-10-23 DIAGNOSIS — Z91.89 FRAMINGHAM CARDIAC RISK 10-20% IN NEXT 10 YEARS: ICD-10-CM

## 2023-10-23 DIAGNOSIS — Z00.01 ENCOUNTER FOR ROUTINE ADULT MEDICAL EXAM WITH ABNORMAL FINDINGS: Primary | ICD-10-CM

## 2023-10-23 DIAGNOSIS — M10.09 IDIOPATHIC GOUT OF MULTIPLE SITES, UNSPECIFIED CHRONICITY: ICD-10-CM

## 2023-10-23 LAB — HBA1C MFR BLD: 5.4 % (ref 0–5.6)

## 2023-10-23 PROCEDURE — 83036 HEMOGLOBIN GLYCOSYLATED A1C: CPT | Performed by: PHYSICIAN ASSISTANT

## 2023-10-23 PROCEDURE — 36415 COLL VENOUS BLD VENIPUNCTURE: CPT | Performed by: PHYSICIAN ASSISTANT

## 2023-10-23 PROCEDURE — 99396 PREV VISIT EST AGE 40-64: CPT | Performed by: PHYSICIAN ASSISTANT

## 2023-10-23 PROCEDURE — 99214 OFFICE O/P EST MOD 30 MIN: CPT | Mod: 25 | Performed by: PHYSICIAN ASSISTANT

## 2023-10-23 PROCEDURE — G0103 PSA SCREENING: HCPCS | Performed by: PHYSICIAN ASSISTANT

## 2023-10-23 PROCEDURE — 80053 COMPREHEN METABOLIC PANEL: CPT | Performed by: PHYSICIAN ASSISTANT

## 2023-10-23 PROCEDURE — 84550 ASSAY OF BLOOD/URIC ACID: CPT | Performed by: PHYSICIAN ASSISTANT

## 2023-10-23 ASSESSMENT — ENCOUNTER SYMPTOMS
EYE PAIN: 0
PARESTHESIAS: 0
FREQUENCY: 0
DIZZINESS: 0
DIARRHEA: 0
SORE THROAT: 0
NAUSEA: 0
FEVER: 0
CHILLS: 0
DYSURIA: 0
COUGH: 0
MYALGIAS: 0
CONSTIPATION: 0
NERVOUS/ANXIOUS: 0
HEARTBURN: 0
HEMATOCHEZIA: 0
WEAKNESS: 0
HEMATURIA: 0
ARTHRALGIAS: 0
ABDOMINAL PAIN: 0
JOINT SWELLING: 0
SHORTNESS OF BREATH: 0
PALPITATIONS: 0
HEADACHES: 0

## 2023-10-23 NOTE — PATIENT INSTRUCTIONS
Calcium scans are done at the [facility]  [address], [place], [place], [address]  Call [phone] to schedule      Ask your insurance about the Shingles vaccine:  1) Is it covered?  2) Where should I get it?

## 2023-10-23 NOTE — PROGRESS NOTES
SUBJECTIVE:   CC: Bryn is an 63 year old who presents for preventative health visit.       10/23/2023     4:31 PM   Additional Questions   Roomed by MOLLY       Healthy Habits:     Getting at least 3 servings of Calcium per day:  NO    Bi-annual eye exam:  NO    Dental care twice a year:  NO    Sleep apnea or symptoms of sleep apnea:  None    Diet:  Low salt    Frequency of exercise:  4-5 days/week    Duration of exercise:  15-30 minutes    Taking medications regularly:  Yes    Medication side effects:  None    Additional concerns today:  No    The 10-year ASCVD risk score (Rima CAST, et al., 2019) is: 9.5%    Values used to calculate the score:      Age: 63 years      Sex: Male      Is Non- : No      Diabetic: No      Tobacco smoker: No      Systolic Blood Pressure: 126 mmHg      Is BP treated: Yes      HDL Cholesterol: 73 mg/dL      Total Cholesterol: 237 mg/dL      Hypertension Follow-up    Do you check your blood pressure regularly outside of the clinic? No   Are you following a low salt diet? Yes  Are your blood pressures ever more than 140 on the top number (systolic) OR more   than 90 on the bottom number (diastolic), for example 140/90? NA      Social History     Tobacco Use    Smoking status: Never     Passive exposure: Never    Smokeless tobacco: Never   Substance Use Topics    Alcohol use: Yes     Comment: Occasional, less than 1 drink per month             10/23/2023     4:45 PM   Alcohol Use   Prescreen: >3 drinks/day or >7 drinks/week? Yes   AUDIT SCORE  5       Last PSA:   PSA   Date Value Ref Range Status   05/27/2020 4.76 (H) 0 - 4 ug/L Final     Comment:     Assay Method:  Chemiluminescence using Siemens Vista analyzer     Prostate Specific Antigen Screen   Date Value Ref Range Status   08/09/2021 4.82 (H) 0.00 - 4.00 ug/L Final       Reviewed orders with patient. Reviewed health maintenance and updated orders accordingly - Yes  Lab work is in process  Labs reviewed in  "EPIC    Reviewed and updated as needed this visit by clinical staff   Tobacco  Allergies  Meds              Reviewed and updated as needed this visit by Provider                 Past Medical History:   Diagnosis Date    Essential hypertension 2/8/2016    Prostate cancer (H) 11/16/2021        Review of Systems   Constitutional:  Negative for chills and fever.   HENT:  Negative for congestion, ear pain, hearing loss and sore throat.    Eyes:  Negative for pain and visual disturbance.   Respiratory:  Negative for cough and shortness of breath.    Cardiovascular:  Negative for chest pain, palpitations and peripheral edema.   Gastrointestinal:  Negative for abdominal pain, constipation, diarrhea, heartburn, hematochezia and nausea.   Genitourinary:  Positive for urgency. Negative for dysuria, frequency, genital sores, hematuria, impotence and penile discharge.   Musculoskeletal:  Negative for arthralgias, joint swelling and myalgias.   Skin:  Negative for rash.   Neurological:  Negative for dizziness, weakness, headaches and paresthesias.   Psychiatric/Behavioral:  Negative for mood changes. The patient is not nervous/anxious.      OBJECTIVE:   /60   Pulse 84   Temp 97.1  F (36.2  C)   Resp 16   Ht 1.727 m (5' 8\")   Wt 89.1 kg (196 lb 6.4 oz)   SpO2 98%   BMI 29.86 kg/m      Physical Exam  GENERAL: healthy, alert and no distress  EYES: Eyes grossly normal to inspection, PERRL and conjunctivae and sclerae normal  HENT: ear canals and TM's normal, nose and mouth without ulcers or lesions  NECK: no adenopathy, no asymmetry, masses, or scars and thyroid normal to palpation  RESP: lungs clear to auscultation - no rales, rhonchi or wheezes  CV: regular rate and rhythm, normal S1 S2, no S3 or S4, no murmur, click or rub, no peripheral edema and peripheral pulses strong  ABDOMEN: soft, nontender, no hepatosplenomegaly, no masses and bowel sounds normal  MS: no gross musculoskeletal defects noted, no edema  SKIN: " no suspicious lesions or rashes  NEURO: Normal strength and tone, mentation intact and speech normal  PSYCH: mentation appears normal, affect normal/bright    ASSESSMENT/PLAN:       ICD-10-CM    1. Encounter for routine adult medical exam with abnormal findings  Z00.01 Adult Eye  Referral      2. Screen for colon cancer  Z12.11 COLOGUARD(EXACT SCIENCES)      3. Diabetes mellitus screening  Z13.1 Hemoglobin A1c     Hemoglobin A1c      4. Screening for prostate cancer  Z12.5 PROSTATE SPEC ANTIGEN SCREEN     PROSTATE SPEC ANTIGEN SCREEN      5. [place] cardiac risk 10-20% in next 10 years  Z91.89 CT Calcium Screening      6. Hypertension, goal below 140/90  I10 Albumin Random Urine Quantitative with Creat Ratio     COMPREHENSIVE METABOLIC PANEL     Albumin Random Urine Quantitative with Creat Ratio     COMPREHENSIVE METABOLIC PANEL     amLODIPine (NORVASC) 10 MG tablet     losartan (COZAAR) 100 MG tablet     carvedilol (COREG) 25 MG tablet     hydrochlorothiazide (HYDRODIURIL) 12.5 MG tablet      7. Idiopathic gout of multiple sites, unspecified chronicity  M10.09 Uric acid     Uric acid     allopurinol (ZYLOPRIM) 100 MG tablet          1-5) Screening measures discussed. Will take the next step in CV screening with a calcium CT. Follow up pending results.     6) Blood pressure at goal. Routine labs in process. Meds renewed, no changes. Follow up annually.    7) Lab in process. Med renewed, no change      COUNSELING:   Reviewed preventive health counseling, as reflected in patient instructions    He reports that he has never smoked. He has never been exposed to tobacco smoke. He has never used smokeless tobacco.            Jayla Trevino PA-C  [facility]

## 2023-10-24 LAB
ALBUMIN SERPL BCG-MCNC: 4.5 G/DL (ref 3.5–5.2)
ALP SERPL-CCNC: 104 U/L (ref 40–129)
ALT SERPL W P-5'-P-CCNC: 34 U/L (ref 0–70)
ANION GAP SERPL CALCULATED.3IONS-SCNC: 18 MMOL/L (ref 7–15)
AST SERPL W P-5'-P-CCNC: 49 U/L (ref 0–45)
BILIRUB SERPL-MCNC: 1.2 MG/DL
BUN SERPL-MCNC: 27.8 MG/DL (ref 8–23)
CALCIUM SERPL-MCNC: 9.5 MG/DL (ref 8.8–10.2)
CHLORIDE SERPL-SCNC: 103 MMOL/L (ref 98–107)
CREAT SERPL-MCNC: 1.85 MG/DL (ref 0.67–1.17)
DEPRECATED HCO3 PLAS-SCNC: 17 MMOL/L (ref 22–29)
EGFRCR SERPLBLD CKD-EPI 2021: 41 ML/MIN/1.73M2
GLUCOSE SERPL-MCNC: 107 MG/DL (ref 70–99)
POTASSIUM SERPL-SCNC: 4.7 MMOL/L (ref 3.4–5.3)
PROT SERPL-MCNC: 7.9 G/DL (ref 6.4–8.3)
PSA SERPL DL<=0.01 NG/ML-MCNC: 4.55 NG/ML (ref 0–4.5)
SODIUM SERPL-SCNC: 138 MMOL/L (ref 135–145)
URATE SERPL-MCNC: 4.7 MG/DL (ref 3.4–7)

## 2023-10-24 PROCEDURE — 82043 UR ALBUMIN QUANTITATIVE: CPT | Performed by: PHYSICIAN ASSISTANT

## 2023-10-24 PROCEDURE — 82570 ASSAY OF URINE CREATININE: CPT | Performed by: PHYSICIAN ASSISTANT

## 2023-10-24 RX ORDER — LOSARTAN POTASSIUM 100 MG/1
100 TABLET ORAL DAILY
Qty: 90 TABLET | Refills: 3 | Status: SHIPPED | OUTPATIENT
Start: 2023-10-24

## 2023-10-24 RX ORDER — CARVEDILOL 25 MG/1
25 TABLET ORAL 2 TIMES DAILY WITH MEALS
Qty: 180 TABLET | Refills: 3 | Status: SHIPPED | OUTPATIENT
Start: 2023-10-24

## 2023-10-24 RX ORDER — ALLOPURINOL 100 MG/1
100 TABLET ORAL DAILY
Qty: 90 TABLET | Refills: 3 | Status: SHIPPED | OUTPATIENT
Start: 2023-10-24

## 2023-10-24 RX ORDER — HYDROCHLOROTHIAZIDE 12.5 MG/1
12.5 TABLET ORAL DAILY
Qty: 90 TABLET | Refills: 3 | Status: SHIPPED | OUTPATIENT
Start: 2023-10-24

## 2023-10-24 RX ORDER — AMLODIPINE BESYLATE 10 MG/1
10 TABLET ORAL DAILY
Qty: 90 TABLET | Refills: 3 | Status: SHIPPED | OUTPATIENT
Start: 2023-10-24

## 2023-10-25 ENCOUNTER — TELEPHONE (OUTPATIENT)
Dept: FAMILY MEDICINE | Facility: CLINIC | Age: 63
End: 2023-10-25
Payer: COMMERCIAL

## 2023-10-25 DIAGNOSIS — R94.4 DECREASED GFR: Primary | ICD-10-CM

## 2023-10-25 LAB
CREAT UR-MCNC: 139 MG/DL
MICROALBUMIN UR-MCNC: 17.4 MG/L
MICROALBUMIN/CREAT UR: 12.52 MG/G CR (ref 0–17)

## 2023-10-25 NOTE — TELEPHONE ENCOUNTER
Spoke with patient, relayed providers message below and patient verbalized understanding. Pt stated no further questions and requesting results to be mailed to him.    Jen Kapadia, RN on 10/25/2023 at 3:40 PM

## 2023-10-25 NOTE — TELEPHONE ENCOUNTER
Called [phone] (home).    Did they answer the phone: No, left a message on voicemail to return call to the [facility] at [phone], and to ask for any available triage nurse.    Nadiya CLANCY BSN  Triage Nurse  [facility]

## 2023-10-25 NOTE — LETTER
October 26, 2023      Bryn Wick  [address] [place] [id number]        Dear ,    We are writing to inform you of your test results.    A1c is normal - no signs of diabetes  Uric acid level is normal  PSA is slightly elevated, but stable compared to last year  Kidney function had decreased again. It was normal last year. Avoid NSAIDs. I'd like to recheck kidney function in 3-4 months, lab only appointment ok.  Liver enzymes stable. Very mild elevation with AST - this is often elevated with alcohol use. If you drink, cut back some.    Resulted Orders   PROSTATE SPEC ANTIGEN SCREEN   Result Value Ref Range    Prostate Specific Antigen Screen 4.55 (H) 0.00 - 4.50 ng/mL    Narrative    This result is obtained using the Roche Elecsys total PSA method on the jennifer [id number] immunoassay analyzer. Results obtained with different assay methods or kits cannot be used interchangeably.   Uric acid   Result Value Ref Range    Uric Acid 4.7 3.4 - 7.0 mg/dL   Albumin Random Urine Quantitative with Creat Ratio   Result Value Ref Range    Creatinine Urine mg/dL 139.0 mg/dL      Comment:      The reference ranges have not been established in urine creatinine. The results should be integrated into the clinical context for interpretation.    Albumin Urine mg/L 17.4 mg/L      Comment:      The reference ranges have not been established in urine albumin. The results should be integrated into the clinical context for interpretation.    Albumin Urine mg/g Cr 12.52 0.00 - 17.00 mg/g Cr      Comment:      Microalbuminuria is defined as an albumin:creatinine ratio of 17 to 299 for males and 25 to 299 for females. A ratio of albumin:creatinine of 300 or higher is indicative of overt proteinuria.  Due to biologic variability, positive results should be confirmed by a second, first-morning random or 24-hour timed urine specimen. If there is discrepancy, a third specimen is recommended. When 2 out of 3 results are in the  microalbuminuria range, this is evidence for incipient nephropathy and warrants increased efforts at glucose control, blood pressure control, and institution of therapy with an angiotensin-converting-enzyme (ACE) inhibitor (if the patient can tolerate it).     COMPREHENSIVE METABOLIC PANEL   Result Value Ref Range    Sodium 138 135 - 145 mmol/L      Comment:      Reference intervals for this test were updated on 09/26/2023 to more accurately reflect our healthy population. There may be differences in the flagging of prior results with similar values performed with this method. Interpretation of those prior results can be made in the context of the updated reference intervals.     Potassium 4.7 3.4 - 5.3 mmol/L    Carbon Dioxide (CO2) 17 (L) 22 - 29 mmol/L    Anion Gap 18 (H) 7 - 15 mmol/L    Urea Nitrogen 27.8 (H) 8.0 - 23.0 mg/dL    Creatinine 1.85 (H) 0.67 - 1.17 mg/dL    GFR Estimate 41 (L) >60 mL/min/1.73m2    Calcium 9.5 8.8 - 10.2 mg/dL    Chloride 103 98 - 107 mmol/L    Glucose 107 (H) 70 - 99 mg/dL    Alkaline Phosphatase 104 40 - 129 U/L    AST 49 (H) 0 - 45 U/L      Comment:      Reference intervals for this test were updated on 6/12/2023 to more accurately reflect our healthy population. There may be differences in the flagging of prior results with similar values performed with this method. Interpretation of those prior results can be made in the context of the updated reference intervals.    ALT 34 0 - 70 U/L      Comment:      Reference intervals for this test were updated on 6/12/2023 to more accurately reflect our healthy population. There may be differences in the flagging of prior results with similar values performed with this method. Interpretation of those prior results can be made in the context of the updated reference intervals.      Protein Total 7.9 6.4 - 8.3 g/dL    Albumin 4.5 3.5 - 5.2 g/dL    Bilirubin Total 1.2 <=1.2 mg/dL   Hemoglobin A1c   Result Value Ref Range    Hemoglobin A1C 5.4  0.0 - 5.6 %      Comment:      Normal <5.7%   Prediabetes 5.7-6.4%    Diabetes 6.5% or higher     Note: Adopted from ADA consensus guidelines.       If you have any questions or concerns, please call the clinic at the number listed above.     Sincerely,    Jayla Trevino PA-C/farrah

## 2023-10-25 NOTE — TELEPHONE ENCOUNTER
Please call patient with the following info:    A1c is normal - no signs of diabetes  Uric acid level is normal  PSA is slightly elevated, but stable compared to last year  Kidney function had decreased again. It was normal last year. Avoid NSAIDs. I'd like to recheck kidney function in 3-4 months, lab only appointment ok.  Liver enzymes stable. Very mild elevation with AST - this is often elevated with alcohol use. If he drinks, cut back some

## 2024-10-23 ENCOUNTER — TELEPHONE (OUTPATIENT)
Dept: FAMILY MEDICINE | Facility: CLINIC | Age: 64
End: 2024-10-23

## 2024-10-23 DIAGNOSIS — M10.09 IDIOPATHIC GOUT OF MULTIPLE SITES, UNSPECIFIED CHRONICITY: ICD-10-CM

## 2024-10-23 NOTE — LETTER
October 24, 2024      Darnell Wick  [address] [place] [phone]        Dear Darnell,     We have been trying to contact you, please give us a call.           Sincerely,        Jayla Trevino PA-C

## 2024-10-23 NOTE — TELEPHONE ENCOUNTER
Called the pt @   [phone]. Left a VM stating he needs an in clinic OV for refills- zeferino refill ok when appt. Is made.  Zechariah Loaiza Registration

## 2024-11-13 ENCOUNTER — TRANSFERRED RECORDS (OUTPATIENT)
Dept: HEALTH INFORMATION MANAGEMENT | Facility: CLINIC | Age: 64
End: 2024-11-13
Payer: COMMERCIAL

## 2024-11-24 DIAGNOSIS — I10 HYPERTENSION, GOAL BELOW 140/90: ICD-10-CM

## 2024-11-25 RX ORDER — LOSARTAN POTASSIUM 100 MG/1
100 TABLET ORAL DAILY
Qty: 30 TABLET | Refills: 0 | Status: SHIPPED | OUTPATIENT
Start: 2024-11-25

## 2024-11-26 RX ORDER — ALLOPURINOL 100 MG/1
100 TABLET ORAL DAILY
Qty: 90 TABLET | Refills: 3 | OUTPATIENT
Start: 2024-11-26

## 2024-11-26 NOTE — TELEPHONE ENCOUNTER
Can someone try to reach out one more time before closing this encounter? Pt needs appt for med.     Thanks,  JULIETH Matthews  [facility]

## 2024-12-06 DIAGNOSIS — M10.09 IDIOPATHIC GOUT OF MULTIPLE SITES, UNSPECIFIED CHRONICITY: ICD-10-CM

## 2024-12-06 DIAGNOSIS — I10 HYPERTENSION, GOAL BELOW 140/90: ICD-10-CM

## 2024-12-06 RX ORDER — AMLODIPINE BESYLATE 10 MG/1
10 TABLET ORAL DAILY
Qty: 90 TABLET | Refills: 3 | OUTPATIENT
Start: 2024-12-06

## 2024-12-06 NOTE — TELEPHONE ENCOUNTER
Medication refill has been denied. NEEDS IN PERSON APPT.   Schedule an appointment when patient returns call to clinic.    RN may refill enough medication up until appt date.

## 2024-12-09 ENCOUNTER — OFFICE VISIT (OUTPATIENT)
Dept: FAMILY MEDICINE | Facility: CLINIC | Age: 64
End: 2024-12-09
Payer: COMMERCIAL

## 2024-12-09 VITALS
TEMPERATURE: 97.6 F | DIASTOLIC BLOOD PRESSURE: 64 MMHG | BODY MASS INDEX: 26.6 KG/M2 | OXYGEN SATURATION: 100 % | HEART RATE: 81 BPM | WEIGHT: 179.6 LBS | HEIGHT: 69 IN | RESPIRATION RATE: 16 BRPM | SYSTOLIC BLOOD PRESSURE: 112 MMHG

## 2024-12-09 DIAGNOSIS — K25.4 GASTROINTESTINAL HEMORRHAGE ASSOCIATED WITH GASTRIC ULCER: Primary | ICD-10-CM

## 2024-12-09 DIAGNOSIS — I10 HYPERTENSION, GOAL BELOW 140/90: ICD-10-CM

## 2024-12-09 LAB — HGB BLD-MCNC: 9.3 G/DL (ref 13.3–17.7)

## 2024-12-09 PROCEDURE — 80048 BASIC METABOLIC PNL TOTAL CA: CPT | Performed by: PHYSICIAN ASSISTANT

## 2024-12-09 PROCEDURE — 85018 HEMOGLOBIN: CPT | Performed by: PHYSICIAN ASSISTANT

## 2024-12-09 PROCEDURE — 99214 OFFICE O/P EST MOD 30 MIN: CPT | Performed by: PHYSICIAN ASSISTANT

## 2024-12-09 PROCEDURE — 36415 COLL VENOUS BLD VENIPUNCTURE: CPT | Performed by: PHYSICIAN ASSISTANT

## 2024-12-09 RX ORDER — PANTOPRAZOLE SODIUM 40 MG/1
40 TABLET, DELAYED RELEASE ORAL
COMMUNITY
Start: 2024-12-03 | End: 2024-12-09

## 2024-12-09 RX ORDER — PANTOPRAZOLE SODIUM 40 MG/1
40 TABLET, DELAYED RELEASE ORAL
Qty: 60 TABLET | Refills: 0 | Status: SHIPPED | OUTPATIENT
Start: 2024-12-09 | End: 2025-01-08

## 2024-12-09 ASSESSMENT — PAIN SCALES - GENERAL: PAINLEVEL_OUTOF10: NO PAIN (0)

## 2024-12-09 NOTE — PROGRESS NOTES
"  Assessment & Plan   Problem List Items Addressed This Visit          Circulatory    Hypertension, goal below 140/90    Relevant Orders    BASIC METABOLIC PANEL     Other Visit Diagnoses       Gastrointestinal hemorrhage associated with gastric ulcer    -  Primary    Relevant Medications    pantoprazole (PROTONIX) 40 MG EC tablet    Other Relevant Orders    Hemoglobin (Completed)    Adult GI  Referral - Procedure Only          1. Upper GI Bleed from gastric ulcer secondary to EtOH and NSAID use.  - Stable, improving  - Continue pantoprazole 40mg BID x 2 months. Refill given.  - Advance diet: Progress from liquid to soft foods  - Follow-up EGD in 2 months  - Referral placed to [facility] for repeat EGD  - Labs today: CBC, Basic metabolic panel, Hgb returned at 9.3  - Avoid NSAIDs  - Last Hgb 8.7    2. Alcohol Use  - Counseled on abstinence  - Discontinue thiamine and folic acid supplements after finishing current Rxs.    3. Back Pain  - Ice/heat therapy  - Continue injections as needed  - Avoid NSAIDs, may use acetaminophen if needed    Complete history and physical exam as below. Afebrile with normal vital signs.    DDx and Dx discussed with and explained to the pt to their satisfaction.  All questions were answered at this time. Pt expressed understanding of and agreement with this dx, tx, and plan. No further workup warranted and standard medication warnings given. I have given the patient a list of pertinent indications for re-evaluation. Will go to the Emergency Department if symptoms worsen or new concerning symptoms arise. Patient left in no apparent distress.      MED REC REQUIRED  Post Medication Reconciliation Status: discharge medications reconciled and changed, per note/orders  BMI  Estimated body mass index is 26.51 kg/m  as calculated from the following:    Height as of this encounter: 1.753 m (5' 9.02\").    Weight as of this encounter: 81.5 kg (179 lb 9.6 oz).     See Patient " Jonn Clemente is a 64 year old, presenting for the following health issues:  Hospital F/U        12/9/2024    10:28 AM   Additional Questions   Roomed by Libby Driver CMA   Accompanied by N/A         12/9/2024    10:28 AM   Patient Reported Additional Medications   Patient reports taking the following new medications No new medications     HPI     Hospital Follow-up Visit:    Hospital/Nursing Home/IP Rehab Facility:  [facility]  Date of Admission: 12/02/2024  Date of Discharge: 12/03/2024  Reason(s) for Admission: GI hemorrhage  Was the patient in the ICU or did the patient experience delirium during hospitalization?  No  Do you have any other stressors you would like to discuss with your provider? No    Problems taking medications regularly:  No  Medication changes since discharge: Unknown  Problems adhering to non-medication therapy:  None    Summary of hospitalization:  [facility] discharge summary reviewed  - Recent hospitalization for upper GI bleed from bleeding gastric ulcer, discharged 1 week ago  - Required 3 units of blood transfusion (Hgb lowest was 4.5)  - History of regular ibuprofen use for back pain  - Reports no further coffee ground emesis  - Last alcohol use was Thanksgiving day  - No alcohol or NSAID use since hospitalization  - Reports stools now brown, previously black  - Reports irregular bowel movements    Past Medical History:  - Back pain requiring injections    Medications:  - Pantoprazole 40mg BID before meals  - Amlodipine  - Losartan  - Lidocaine  - Multivitamin    Social History:  - History of alcohol use, now abstinent    Diagnostic Tests/Treatments reviewed.  Follow up needed: Hgb and BMP today.  Other Healthcare Providers Involved in Patient s Care:         [facility] EGD in 2 months to ensure gastric ulcer resolved.  Update since discharge: improved.     Plan of care communicated with patient       Review of Systems  Constitutional, HEENT,  "cardiovascular, pulmonary, gi and gu systems are negative, except as otherwise noted.      Objective    /64   Pulse 81   Temp 97.6  F (36.4  C) (Temporal)   Resp 16   Ht 1.753 m (5' 9.02\")   Wt 81.5 kg (179 lb 9.6 oz)   SpO2 100%   BMI 26.51 kg/m    Body mass index is 26.51 kg/m .  Physical Exam  Vitals and nursing note reviewed.   Constitutional:       General: He is not in acute distress.     Appearance: He is not ill-appearing or diaphoretic.   HENT:      Head: Normocephalic and atraumatic.      Mouth/Throat:      Mouth: Mucous membranes are moist.   Eyes:      Conjunctiva/sclera: Conjunctivae normal.   Cardiovascular:      Rate and Rhythm: Normal rate and regular rhythm.      Heart sounds: Normal heart sounds. No murmur heard.     No friction rub. No gallop.   Pulmonary:      Effort: Pulmonary effort is normal. No respiratory distress.      Breath sounds: Normal breath sounds. No stridor. No wheezing, rhonchi or rales.   Abdominal:      General: Bowel sounds are normal. There is no distension.      Palpations: Abdomen is soft. There is no mass.      Tenderness: There is no abdominal tenderness. There is no right CVA tenderness, left CVA tenderness, guarding or rebound.      Hernia: No hernia is present.   Skin:     General: Skin is warm and dry.      Coloration: Skin is pale.   Neurological:      General: No focal deficit present.      Mental Status: He is alert. Mental status is at baseline.   Psychiatric:         Mood and Affect: Mood normal.         Behavior: Behavior normal.          Results for orders placed or performed in visit on 12/09/24   Hemoglobin     Status: Abnormal   Result Value Ref Range    Hemoglobin 9.3 (L) 13.3 - 17.7 g/dL           Signed Electronically by: KATHERINE Lee    "

## 2024-12-09 NOTE — PATIENT INSTRUCTIONS
Mack Clemente,    Thank you for allowing [facility] to manage your care.    Your stomach ulcer will need to be treated with pantoprazole twice daily for 2 months and you need a follow up upper endoscopy/EGD in 2 months to ensure this has healed.    If you develop worsening/changing symptoms such as black/bloody stool, coffee ground vomit, lightheadedness, or other worrisome symptoms at any time, please call 911/go to the emergency department for evaluation.    I ordered some lab work. Please go to the laboratory to get your studies.    I sent your prescriptions to your pharmacy.    I made a referral to [facility] for a repeat EGD. They will be calling in approximately 1 week to set up your appointment.  If you do not hear from them, please call the specialty number on your after visit summary.     Please allow 1-2 business days for our office to contact you in regards to your laboratory/radiological studies.  If not done so, I encourage you to login into [employer] ([email or web address] to review your results as well.     If you have any questions or concerns, please feel free to call us at [phone]    Sincerely,    Nahid Landis PA-C    Did you know?      You can schedule a video visit for follow-up appointments as well as future appointments for certain conditions.  Please see the below link.     [email or web address]    If you have not already done so,  I encourage you to sign up for [employer] ([email or web address]).  This will allow you to review your results, securely communicate with a provider, and schedule virtual visits as well.

## 2024-12-09 NOTE — LETTER
December 10, 2024      Bryn Wick  [address] [place] [id number]        Dear ,    We are writing to inform you of your test results.    Your test results fall within the expected range(s) or remain unchanged from previous results.  Please continue with current treatment plan. Your hemoglobin should continue to rise if you continue to abstain fro alcohol and ibuprofen/NSAIDs. Keep up the good work.     Resulted Orders   BASIC METABOLIC PANEL   Result Value Ref Range    Sodium 137 135 - 145 mmol/L    Potassium 4.9 3.4 - 5.3 mmol/L    Chloride 105 98 - 107 mmol/L    Carbon Dioxide (CO2) 19 (L) 22 - 29 mmol/L    Anion Gap 13 7 - 15 mmol/L    Urea Nitrogen 16.2 8.0 - 23.0 mg/dL    Creatinine 1.78 (H) 0.67 - 1.17 mg/dL    GFR Estimate 43 (L) >60 mL/min/1.73m2      Comment:      eGFR calculated using 2021 CKD-EPI equation.    Calcium 9.3 8.8 - 10.4 mg/dL      Comment:      Reference intervals for this test were updated on 7/16/2024 to reflect our healthy population more accurately. There may be differences in the flagging of prior results with similar values performed with this method. Those prior results can be interpreted in the context of the updated reference intervals.    Glucose 109 (H) 70 - 99 mg/dL   Hemoglobin   Result Value Ref Range    Hemoglobin 9.3 (L) 13.3 - 17.7 g/dL       If you have any questions or concerns, please call the clinic at the number listed above.       Sincerely,      KATHERINE Lee

## 2024-12-10 LAB
ANION GAP SERPL CALCULATED.3IONS-SCNC: 13 MMOL/L (ref 7–15)
BUN SERPL-MCNC: 16.2 MG/DL (ref 8–23)
CALCIUM SERPL-MCNC: 9.3 MG/DL (ref 8.8–10.4)
CHLORIDE SERPL-SCNC: 105 MMOL/L (ref 98–107)
CREAT SERPL-MCNC: 1.78 MG/DL (ref 0.67–1.17)
EGFRCR SERPLBLD CKD-EPI 2021: 43 ML/MIN/1.73M2
GLUCOSE SERPL-MCNC: 109 MG/DL (ref 70–99)
HCO3 SERPL-SCNC: 19 MMOL/L (ref 22–29)
POTASSIUM SERPL-SCNC: 4.9 MMOL/L (ref 3.4–5.3)
SODIUM SERPL-SCNC: 137 MMOL/L (ref 135–145)

## 2024-12-10 NOTE — TELEPHONE ENCOUNTER
Jayla- the patient was seen in clinic yesterday,12/9/2024 with Nahid Landis. Please advise if he needs to come in again for this refill.  Thank you,  Zechariah Loaiza Registration

## 2024-12-11 NOTE — TELEPHONE ENCOUNTER
Patient has called back and scheduled an appt with seferino for the next available opening she had 3/3/2025. PCP stated below that patient can get refills on medications up until his appt as long as he schedules.     Patient needs refills on following prescriptions:    carvedilol (COREG) 25 MG tablet   allopurinol (ZYLOPRIM) 100 MG tablet   amLODIPine (NORVASC) 10 MG tablet   losartan (COZAAR) 100 MG tablet     Pharmacy:  [facility]/PHARMACY #[id number] - [facility] [address] [place] [address] AT [facility] [address] [facility]    Patient had no further questions or concerns but is wanting a call back once prescriptions are approved for refills.    Call back at/ detailed message ok'd: [phone]      Esperanza STOCKTON [facility],

## 2024-12-16 RX ORDER — LOSARTAN POTASSIUM 100 MG/1
100 TABLET ORAL DAILY
Qty: 90 TABLET | Refills: 0 | Status: SHIPPED | OUTPATIENT
Start: 2024-12-16

## 2024-12-16 RX ORDER — ALLOPURINOL 100 MG/1
100 TABLET ORAL DAILY
Qty: 90 TABLET | Refills: 0 | Status: SHIPPED | OUTPATIENT
Start: 2024-12-16

## 2024-12-16 RX ORDER — CARVEDILOL 25 MG/1
25 TABLET ORAL 2 TIMES DAILY WITH MEALS
Qty: 180 TABLET | Refills: 0 | Status: SHIPPED | OUTPATIENT
Start: 2024-12-16

## 2024-12-16 RX ORDER — AMLODIPINE BESYLATE 10 MG/1
10 TABLET ORAL DAILY
Qty: 90 TABLET | Refills: 0 | Status: SHIPPED | OUTPATIENT
Start: 2024-12-16

## 2024-12-19 NOTE — TELEPHONE ENCOUNTER
I called patient at home number, no answer, left brief message advising requested Rx's were refilled.    Sumaya NAGEL RN  [facility]

## 2025-02-03 ENCOUNTER — TRANSFERRED RECORDS (OUTPATIENT)
Dept: HEALTH INFORMATION MANAGEMENT | Facility: CLINIC | Age: 65
End: 2025-02-03
Payer: COMMERCIAL

## 2025-03-15 DIAGNOSIS — M10.09 IDIOPATHIC GOUT OF MULTIPLE SITES, UNSPECIFIED CHRONICITY: ICD-10-CM

## 2025-03-15 DIAGNOSIS — I10 HYPERTENSION, GOAL BELOW 140/90: ICD-10-CM

## 2025-03-17 DIAGNOSIS — I10 HYPERTENSION, GOAL BELOW 140/90: ICD-10-CM

## 2025-03-17 DIAGNOSIS — M10.09 IDIOPATHIC GOUT OF MULTIPLE SITES, UNSPECIFIED CHRONICITY: ICD-10-CM

## 2025-03-17 RX ORDER — ALLOPURINOL 100 MG/1
100 TABLET ORAL DAILY
Qty: 90 TABLET | Refills: 0 | Status: SHIPPED | OUTPATIENT
Start: 2025-03-17

## 2025-03-17 RX ORDER — CARVEDILOL 25 MG/1
25 TABLET ORAL 2 TIMES DAILY WITH MEALS
Qty: 180 TABLET | Refills: 0 | OUTPATIENT
Start: 2025-03-17

## 2025-03-17 RX ORDER — LOSARTAN POTASSIUM 100 MG/1
100 TABLET ORAL DAILY
Qty: 90 TABLET | Refills: 0 | Status: SHIPPED | OUTPATIENT
Start: 2025-03-17

## 2025-03-17 RX ORDER — LOSARTAN POTASSIUM 100 MG/1
100 TABLET ORAL DAILY
Qty: 90 TABLET | Refills: 0 | OUTPATIENT
Start: 2025-03-17

## 2025-03-17 RX ORDER — HYDROCHLOROTHIAZIDE 12.5 MG/1
12.5 TABLET ORAL DAILY
Qty: 90 TABLET | Refills: 0 | Status: SHIPPED | OUTPATIENT
Start: 2025-03-17

## 2025-03-17 RX ORDER — ALLOPURINOL 100 MG/1
100 TABLET ORAL DAILY
Qty: 90 TABLET | Refills: 0 | OUTPATIENT
Start: 2025-03-17

## 2025-03-17 RX ORDER — AMLODIPINE BESYLATE 10 MG/1
10 TABLET ORAL DAILY
Qty: 90 TABLET | Refills: 0 | Status: SHIPPED | OUTPATIENT
Start: 2025-03-17

## 2025-03-17 RX ORDER — CARVEDILOL 25 MG/1
25 TABLET ORAL 2 TIMES DAILY WITH MEALS
Qty: 180 TABLET | Refills: 0 | Status: SHIPPED | OUTPATIENT
Start: 2025-03-17

## 2025-03-17 NOTE — TELEPHONE ENCOUNTER
I haven't seen patient since Oct 2023.     Medication refill has been denied. NEEDS IN PERSON APPT.   Schedule an appointment when patient returns call to clinic.    RN may refill enough medication up until appt date.

## 2025-03-17 NOTE — TELEPHONE ENCOUNTER
Future Appointments 3/17/2025 - 9/13/2025        Date Visit Type Length Department Provider     6/9/2025  2:30 PM PREVENTATIVE ADULT 30 min [facility] Jayla Trevino PA-C    Location Instructions:     [facility] Kimmy Apple is located at [address] [address], one block east of [address]  and just north of the [employer]. Access the parking lot by turning east from [address] onto [address], then turning north on [address].                   See Encounter on 3/17/25.   Provider approved 3 month supplies.     Tatum Crocker RN BSN  [facility]

## 2025-03-17 NOTE — TELEPHONE ENCOUNTER
Patient had to reschedule appointment due to provider being out of clinic  and now cannot be seen until June. Patient will need refills until appointment

## 2025-03-26 NOTE — TELEPHONE ENCOUNTER
Health Maintenance       Pneumococcal Vaccine 50+ (1 of 1 - PCV)  Never done    COVID-19 Vaccine (4 - 2024-25 season)  Overdue since 9/1/2024           Following review of the above:  Patient is not proceeding with: COVID-19 and Pneumococcal    Note: Refer to final orders and clinician documentation.        Called [phone] (home)     Did patient answer the phone: No, left a message on voicemail to return call to the [facility] at [phone].    Nadiya RN,BSN  Triage Nurse  [facility]: [facility]

## 2025-06-09 ENCOUNTER — OFFICE VISIT (OUTPATIENT)
Dept: FAMILY MEDICINE | Facility: CLINIC | Age: 65
End: 2025-06-09
Payer: COMMERCIAL

## 2025-06-09 VITALS
DIASTOLIC BLOOD PRESSURE: 64 MMHG | OXYGEN SATURATION: 98 % | RESPIRATION RATE: 20 BRPM | BODY MASS INDEX: 27.43 KG/M2 | HEIGHT: 69 IN | SYSTOLIC BLOOD PRESSURE: 128 MMHG | TEMPERATURE: 98.2 F | WEIGHT: 185.2 LBS | HEART RATE: 59 BPM

## 2025-06-09 DIAGNOSIS — M25.551 CHRONIC PAIN OF BOTH HIPS: ICD-10-CM

## 2025-06-09 DIAGNOSIS — Z12.5 SCREENING FOR PROSTATE CANCER: ICD-10-CM

## 2025-06-09 DIAGNOSIS — G89.29 CHRONIC PAIN OF BOTH HIPS: ICD-10-CM

## 2025-06-09 DIAGNOSIS — I10 HYPERTENSION, GOAL BELOW 140/90: ICD-10-CM

## 2025-06-09 DIAGNOSIS — E78.5 HYPERLIPIDEMIA LDL GOAL <130: ICD-10-CM

## 2025-06-09 DIAGNOSIS — M25.552 CHRONIC PAIN OF BOTH HIPS: ICD-10-CM

## 2025-06-09 DIAGNOSIS — Z00.00 ENCOUNTER FOR ROUTINE ADULT HEALTH EXAMINATION WITHOUT ABNORMAL FINDINGS: Primary | ICD-10-CM

## 2025-06-09 DIAGNOSIS — M10.09 IDIOPATHIC GOUT OF MULTIPLE SITES, UNSPECIFIED CHRONICITY: ICD-10-CM

## 2025-06-09 LAB
CREAT UR-MCNC: 164 MG/DL
MICROALBUMIN UR-MCNC: <12 MG/L
MICROALBUMIN/CREAT UR: NORMAL MG/G{CREAT}

## 2025-06-09 PROCEDURE — 3074F SYST BP LT 130 MM HG: CPT | Performed by: PHYSICIAN ASSISTANT

## 2025-06-09 PROCEDURE — 36415 COLL VENOUS BLD VENIPUNCTURE: CPT | Performed by: PHYSICIAN ASSISTANT

## 2025-06-09 PROCEDURE — 82043 UR ALBUMIN QUANTITATIVE: CPT | Performed by: PHYSICIAN ASSISTANT

## 2025-06-09 PROCEDURE — 99214 OFFICE O/P EST MOD 30 MIN: CPT | Mod: 25 | Performed by: PHYSICIAN ASSISTANT

## 2025-06-09 PROCEDURE — 84550 ASSAY OF BLOOD/URIC ACID: CPT | Performed by: PHYSICIAN ASSISTANT

## 2025-06-09 PROCEDURE — 99396 PREV VISIT EST AGE 40-64: CPT | Performed by: PHYSICIAN ASSISTANT

## 2025-06-09 PROCEDURE — 82570 ASSAY OF URINE CREATININE: CPT | Performed by: PHYSICIAN ASSISTANT

## 2025-06-09 PROCEDURE — 80053 COMPREHEN METABOLIC PANEL: CPT | Performed by: PHYSICIAN ASSISTANT

## 2025-06-09 PROCEDURE — 3078F DIAST BP <80 MM HG: CPT | Performed by: PHYSICIAN ASSISTANT

## 2025-06-09 PROCEDURE — G0103 PSA SCREENING: HCPCS | Performed by: PHYSICIAN ASSISTANT

## 2025-06-09 PROCEDURE — 80061 LIPID PANEL: CPT | Performed by: PHYSICIAN ASSISTANT

## 2025-06-09 RX ORDER — CARVEDILOL 25 MG/1
25 TABLET ORAL 2 TIMES DAILY WITH MEALS
Qty: 180 TABLET | Refills: 3 | Status: SHIPPED | OUTPATIENT
Start: 2025-06-09

## 2025-06-09 RX ORDER — AMLODIPINE BESYLATE 10 MG/1
10 TABLET ORAL DAILY
Qty: 90 TABLET | Refills: 3 | Status: SHIPPED | OUTPATIENT
Start: 2025-06-09

## 2025-06-09 RX ORDER — NORTRIPTYLINE HYDROCHLORIDE 10 MG/1
10 CAPSULE ORAL AT BEDTIME
Qty: 60 CAPSULE | Refills: 1 | Status: SHIPPED | OUTPATIENT
Start: 2025-06-09

## 2025-06-09 RX ORDER — ALLOPURINOL 100 MG/1
100 TABLET ORAL DAILY
Qty: 90 TABLET | Refills: 3 | Status: SHIPPED | OUTPATIENT
Start: 2025-06-09

## 2025-06-09 RX ORDER — LOSARTAN POTASSIUM 100 MG/1
100 TABLET ORAL DAILY
Qty: 90 TABLET | Refills: 3 | Status: SHIPPED | OUTPATIENT
Start: 2025-06-09

## 2025-06-09 NOTE — PROGRESS NOTES
"Preventive Care Visit  [facility] ZELDA Trevino PA-C, Family Medicine  Jun 9, 2025      Assessment & Plan       ICD-10-CM    1. Encounter for routine adult health examination without abnormal findings  Z00.00       2. Screening for prostate cancer  Z12.5 PROSTATE SPEC ANTIGEN SCREEN      3. Hypertension, goal below 140/90  I10 COMPREHENSIVE METABOLIC PANEL     Albumin Random Urine Quantitative with Creat Ratio     losartan (COZAAR) 100 MG tablet     carvedilol (COREG) 25 MG tablet     amLODIPine (NORVASC) 10 MG tablet      4. Hyperlipidemia LDL goal <130  E78.5 Lipid panel reflex to direct LDL Non-fasting      5. Idiopathic gout of multiple sites, unspecified chronicity  M10.09 Uric acid     allopurinol (ZYLOPRIM) 100 MG tablet      6. Chronic pain of both hips  M25.551 nortriptyline (PAMELOR) 10 MG capsule    M25.552     G89.29           1,2) Screenings/preventative measures discussed    3-5) Blood pressure at goal. Routine labs in process. Meds renewed, no changes. Has been off of hydrochlorothiazide for some time now.    6) Will trial nortriptyline 10-20mg while he's waiting to have a hip replacement.     Patient Instructions   Glucosamine Chondroitin - supplement for joint pain    Ask your insurance about the Shingles vaccine:  1) Is it covered?  2) Where should I get it?       BMI  Estimated body mass index is 27.34 kg/m  as calculated from the following:    Height as of this encounter: 1.753 m (5' 9.02\").    Weight as of this encounter: 84 kg (185 lb 3.2 oz).     Return in about 1 year (around 6/9/2026) for your annual physical, a med check, repeat labs, with Jayla, in person.      Italo Clemente is a 65 year old, presenting for the following:  Medicare Visit        6/9/2025     2:10 PM   Additional Questions   Roomed by Gina   Accompanied by leland         6/9/2025     2:10 PM   Patient Reported Additional Medications   Patient reports taking the following new medications none    "       History of Present Illness       Hyperlipidemia:  He presents for follow up of hyperlipidemia.   He is taking medication to lower cholesterol. He is not having myalgia or other side effects to statin medications.    Hypertension: He presents for follow up of hypertension.  He does check blood pressure  regularly outside of the clinic. Outpatient blood pressures have not been over 140/90. He does not follow a low salt diet.         Advance Care Planning    Not discussed         No data to display                  10/23/2023   Nutrition   Three or more servings of calcium each day? No   Diet: low salt         10/23/2023   Exercise   Frequency of exercise: 4-5 days/week         10/23/2023   Social Factors   Worry food won't last until get money to buy more No   Food not last or not have enough money for food? No   Do you have housing? (Housing is defined as stable permanent housing and does not include staying outside in a car, in a tent, in an abandoned building, in an overnight shelter, or couch-surfing.) Yes   Are you worried about losing your housing? No   Lack of transportation? No   Unable to get utilities (heat,electricity)? No         10/23/2023   Dental   Dentist two times every year? No         Today's PHQ-2 Score:       6/9/2025     2:13 PM   PHQ-2 ( 1999 [employer])   Q1: Little interest or pleasure in doing things 0   Q2: Feeling down, depressed or hopeless 0   PHQ-2 Score 0           10/23/2023   Substance Use   Alcohol more than 3/day or more than 7/wk Yes   How often do you have a drink containing alcohol 4 or more times a week   How many alcohol drinks on typical day 3 or 4   How often do you have 5+ drinks at one occasion Never   Audit 2/3 Score 1   How often not able to stop drinking once started Never   How often failed to do what normally expected Never   How often needed first drink in am after a heavy drinking session Never   How often feeling of guilt or remorse after drinking Never   How  "often unable to remember what happened the night before Never   Have you or someone else been injured because of your drinking No   Has anyone been concerned or suggested you cut down on drinking No   TOTAL SCORE - AUDIT 5     Social History     Tobacco Use    Smoking status: Never     Passive exposure: Never    Smokeless tobacco: Never   Vaping Use    Vaping status: Never Used   Substance Use Topics    Alcohol use: Yes     Comment: Occasional, less than 1 drink per month    Drug use: No       Last PSA:   PSA   Date Value Ref Range Status   05/27/2020 4.76 (H) 0 - 4 ug/L Final     Comment:     Assay Method:  Chemiluminescence using Siemens Vista analyzer     Prostate Specific Antigen Screen   Date Value Ref Range Status   10/23/2023 4.55 (H) 0.00 - 4.50 ng/mL Final   08/09/2021 4.82 (H) 0.00 - 4.00 ug/L Final     ASCVD Risk   The ASCVD Risk score (Rima CAST, et al., 2019) failed to calculate for the following reasons:    Cannot find a previous HDL lab    Cannot find a previous total cholesterol lab       Reviewed and updated as needed this visit by Provider                      Review of Systems  Constitutional, neuro, ENT, endocrine, pulmonary, cardiac, gastrointestinal, genitourinary, musculoskeletal, integument and psychiatric systems are negative, except as otherwise noted.     Objective    Exam  /64   Pulse 59   Temp 98.2  F (36.8  C) (Oral)   Resp 20   Ht 1.753 m (5' 9.02\")   Wt 84 kg (185 lb 3.2 oz)   SpO2 98%   BMI 27.34 kg/m     Estimated body mass index is 27.34 kg/m  as calculated from the following:    Height as of this encounter: 1.753 m (5' 9.02\").    Weight as of this encounter: 84 kg (185 lb 3.2 oz).    Physical Exam  GENERAL: alert and no distress  EYES: Eyes grossly normal to inspection  HENT: ear canals and TM's normal, nose and mouth without ulcers or lesions  NECK: no adenopathy, no asymmetry, masses, or scars  RESP: lungs clear to auscultation - no rales, rhonchi or " wheezes  CV: regular rates and rhythm, normal S1 S2, no S3 or S4, and no murmur, click or rub  MS: no gross musculoskeletal defects noted, no edema  SKIN: no suspicious lesions or rashes  NEURO: Normal strength and tone, mentation intact and speech normal  PSYCH: mentation appears normal, affect normal/bright        Signed Electronically by: Jayla Trevino PA-C

## 2025-06-09 NOTE — PATIENT INSTRUCTIONS
Glucosamine Chondroitin - supplement for joint pain    Ask your insurance about the Shingles vaccine:  1) Is it covered?  2) Where should I get it?

## 2025-06-10 ENCOUNTER — RESULTS FOLLOW-UP (OUTPATIENT)
Dept: FAMILY MEDICINE | Facility: CLINIC | Age: 65
End: 2025-06-10

## 2025-06-10 DIAGNOSIS — N18.32 STAGE 3B CHRONIC KIDNEY DISEASE (H): Primary | ICD-10-CM

## 2025-06-10 DIAGNOSIS — E78.5 HYPERLIPIDEMIA LDL GOAL <130: ICD-10-CM

## 2025-06-10 LAB
ALBUMIN SERPL BCG-MCNC: 4.4 G/DL (ref 3.5–5.2)
ALP SERPL-CCNC: 97 U/L (ref 40–150)
ALT SERPL W P-5'-P-CCNC: 12 U/L (ref 0–70)
ANION GAP SERPL CALCULATED.3IONS-SCNC: 12 MMOL/L (ref 7–15)
AST SERPL W P-5'-P-CCNC: 19 U/L (ref 0–45)
BILIRUB SERPL-MCNC: 0.4 MG/DL
BUN SERPL-MCNC: 38.5 MG/DL (ref 8–23)
CALCIUM SERPL-MCNC: 9.4 MG/DL (ref 8.8–10.4)
CHLORIDE SERPL-SCNC: 105 MMOL/L (ref 98–107)
CHOLEST SERPL-MCNC: 176 MG/DL
CREAT SERPL-MCNC: 2.08 MG/DL (ref 0.67–1.17)
EGFRCR SERPLBLD CKD-EPI 2021: 35 ML/MIN/1.73M2
FASTING STATUS PATIENT QL REPORTED: YES
FASTING STATUS PATIENT QL REPORTED: YES
GLUCOSE SERPL-MCNC: 102 MG/DL (ref 70–99)
HCO3 SERPL-SCNC: 20 MMOL/L (ref 22–29)
HDLC SERPL-MCNC: 33 MG/DL
LDLC SERPL CALC-MCNC: 108 MG/DL
NONHDLC SERPL-MCNC: 143 MG/DL
POTASSIUM SERPL-SCNC: 4.9 MMOL/L (ref 3.4–5.3)
PROT SERPL-MCNC: 7.3 G/DL (ref 6.4–8.3)
PSA SERPL DL<=0.01 NG/ML-MCNC: 4.18 NG/ML (ref 0–4.5)
SODIUM SERPL-SCNC: 137 MMOL/L (ref 135–145)
TRIGL SERPL-MCNC: 174 MG/DL
URATE SERPL-MCNC: 5.9 MG/DL (ref 3.4–7)

## 2025-06-16 RX ORDER — ATORVASTATIN CALCIUM 20 MG/1
20 TABLET, FILM COATED ORAL DAILY
Qty: 90 TABLET | Refills: 0 | Status: SHIPPED | OUTPATIENT
Start: 2025-06-16

## 2025-07-03 DIAGNOSIS — G89.29 CHRONIC PAIN OF BOTH HIPS: ICD-10-CM

## 2025-07-03 DIAGNOSIS — M25.552 CHRONIC PAIN OF BOTH HIPS: ICD-10-CM

## 2025-07-03 DIAGNOSIS — M25.551 CHRONIC PAIN OF BOTH HIPS: ICD-10-CM

## 2025-07-03 RX ORDER — NORTRIPTYLINE HYDROCHLORIDE 10 MG/1
10 CAPSULE ORAL AT BEDTIME
Qty: 180 CAPSULE | Refills: 1 | OUTPATIENT
Start: 2025-07-03

## 2025-07-09 ENCOUNTER — OFFICE VISIT (OUTPATIENT)
Dept: FAMILY MEDICINE | Facility: CLINIC | Age: 65
End: 2025-07-09
Payer: COMMERCIAL

## 2025-07-09 VITALS
WEIGHT: 182.8 LBS | HEART RATE: 76 BPM | RESPIRATION RATE: 20 BRPM | HEIGHT: 69 IN | BODY MASS INDEX: 27.08 KG/M2 | TEMPERATURE: 98.5 F | OXYGEN SATURATION: 98 % | SYSTOLIC BLOOD PRESSURE: 100 MMHG | DIASTOLIC BLOOD PRESSURE: 60 MMHG

## 2025-07-09 DIAGNOSIS — N18.32 STAGE 3B CHRONIC KIDNEY DISEASE (H): ICD-10-CM

## 2025-07-09 DIAGNOSIS — Z01.818 PRE-OP EVALUATION: Primary | ICD-10-CM

## 2025-07-09 DIAGNOSIS — M16.11 PRIMARY OSTEOARTHRITIS OF RIGHT HIP: ICD-10-CM

## 2025-07-09 DIAGNOSIS — I10 HYPERTENSION, GOAL BELOW 140/90: ICD-10-CM

## 2025-07-09 PROBLEM — N18.31 STAGE 3A CHRONIC KIDNEY DISEASE (H): Status: ACTIVE | Noted: 2025-07-09

## 2025-07-09 LAB
ERYTHROCYTE [DISTWIDTH] IN BLOOD BY AUTOMATED COUNT: 12.2 % (ref 10–15)
HCT VFR BLD AUTO: 32.6 % (ref 40–53)
HGB BLD-MCNC: 11 G/DL (ref 13.3–17.7)
MCH RBC QN AUTO: 31.5 PG (ref 26.5–33)
MCHC RBC AUTO-ENTMCNC: 33.7 G/DL (ref 31.5–36.5)
MCV RBC AUTO: 93 FL (ref 78–100)
PLATELET # BLD AUTO: 251 10E3/UL (ref 150–450)
RBC # BLD AUTO: 3.49 10E6/UL (ref 4.4–5.9)
WBC # BLD AUTO: 6.2 10E3/UL (ref 4–11)

## 2025-07-09 PROCEDURE — 3074F SYST BP LT 130 MM HG: CPT | Performed by: PHYSICIAN ASSISTANT

## 2025-07-09 PROCEDURE — 99214 OFFICE O/P EST MOD 30 MIN: CPT | Performed by: PHYSICIAN ASSISTANT

## 2025-07-09 PROCEDURE — 80048 BASIC METABOLIC PNL TOTAL CA: CPT | Performed by: PHYSICIAN ASSISTANT

## 2025-07-09 PROCEDURE — 3078F DIAST BP <80 MM HG: CPT | Performed by: PHYSICIAN ASSISTANT

## 2025-07-09 PROCEDURE — 85027 COMPLETE CBC AUTOMATED: CPT | Performed by: PHYSICIAN ASSISTANT

## 2025-07-09 PROCEDURE — 36415 COLL VENOUS BLD VENIPUNCTURE: CPT | Performed by: PHYSICIAN ASSISTANT

## 2025-07-09 RX ORDER — PANTOPRAZOLE SODIUM 40 MG/1
40 TABLET, DELAYED RELEASE ORAL DAILY
COMMUNITY
Start: 2025-04-17

## 2025-07-09 NOTE — PROGRESS NOTES
Preoperative Evaluation  [facility] [place]  [address]  [place] [address]  Phone: [phone]  Primary Provider: Jayla Trevino PA-C  Pre-op Performing Provider: Jayla Trevino PA-C  Jul 9, 2025 7/9/2025   Surgical Information   What procedure is being done? R total hip then L total hip   Facility or Hospital where procedure/surgery will be performed: [facility] orthopedic   Who is doing the procedure / surgery? Dr Cunningham   Date of surgery / procedure: 7/21/25   Time of surgery / procedure: TBD   Where do you plan to recover after surgery? Inpatient 1 night     Fax number for surgical facility: [phone]    Assessment & Plan     The proposed surgical procedure is considered INTERMEDIATE risk.      ICD-10-CM    1. Pre-op evaluation  Z01.818 CBC with platelets     CBC with platelets      2. Primary osteoarthritis of right hip  M16.11       3. Hypertension, goal below 140/90  I10 Basic metabolic panel  (Ca, Cl, CO2, Creat, Gluc, K, Na, BUN)     Basic metabolic panel  (Ca, Cl, CO2, Creat, Gluc, K, Na, BUN)      4. Stage 3a chronic kidney disease (H)  N18.31           Blood pressure at goal.  HGB improved, back up to 11 after a GI bleed in December 2024. Monitor perioperatively.  Kidney function decreased, GFR in the 30's for the last 1 month. Monitor perioperatively.        - No identified additional risk factors other than previously addressed    Antiplatelet or Anticoagulation Medication Instructions   - We reviewed the medication list and the patient is not on an antiplatelet or anticoagulation medications.    Additional Medication Instructions  We reviewed the medication list and there are no chronic medications that need to be adjusted for this procedure.    Recommendation  Approval given to proceed with proposed procedure, without further diagnostic evaluation.    Return in about 11 months (around 6/9/2026) for your annual physical, a med check, repeat  labs, with Jayla, in person.      Italo Clemente is a 65 year old, presenting for the following:  Pre-Op Exam          7/9/2025    10:06 AM   Additional Questions   Roomed by Gina   Accompanied by leland         7/9/2025    10:06 AM   Patient Reported Additional Medications   Patient reports taking the following new medications none     HPI: OA, both hips - requiring surgery on right initially           7/9/2025   Pre-Op Questionnaire   Have you ever had a heart attack or stroke? No   Have you ever had surgery on your heart or blood vessels, such as a stent placement, a coronary artery bypass, or surgery on an artery in your head, neck, heart, or legs? No   Do you have chest pain with activity? No   Do you have a history of heart failure? No   Do you currently have a cold, bronchitis or symptoms of other infection? No   Do you have a cough, shortness of breath, or wheezing? No   Do you or anyone in your family have previous history of blood clots? No   Do you or does anyone in your family have a serious bleeding problem such as prolonged bleeding following surgeries or cuts? No   Have you ever had problems with anemia or been told to take iron pills? No   Have you had any abnormal blood loss such as black, tarry or bloody stools? No   Have you ever had a blood transfusion? (!) YES   Have you ever had a transfusion reaction? No   Are you willing to have a blood transfusion if it is medically needed before, during, or after your surgery? Yes   Have you or any of your relatives ever had problems with anesthesia? No   Do you have sleep apnea, excessive snoring or daytime drowsiness? No   Do you have any artifical heart valves or other implanted medical devices like a pacemaker, defibrillator, or continuous glucose monitor? No   Do you have artificial joints? No   Are you allergic to latex? No     Advance Care Planning    Not on file    Preoperative Review of    reviewed - controlled substances prescribed by other  outside provider(s).  Rx from Feb 2025    Status of Chronic Conditions:  See problem list for active medical problems.  Problems all longstanding and stable, except as noted/documented.  See ROS for pertinent symptoms related to these conditions.    Patient Active Problem List    Diagnosis Date Noted    Prostate cancer (H) 11/16/2021     Priority: Medium    Elevated prostate specific antigen (PSA) 07/23/2020     Priority: Medium    Hyperlipidemia LDL goal <130 02/26/2018     Priority: Medium    Hypertension, goal below 140/90 02/15/2016     Priority: Medium    Idiopathic gout of multiple sites, unspecified chronicity 02/08/2016     Priority: Medium      Past Medical History:   Diagnosis Date    Essential hypertension 2/8/2016    Prostate cancer (H) 11/16/2021     Past Surgical History:   Procedure Laterality Date    ORTHOPEDIC SURGERY      left middle finger     Current Outpatient Medications   Medication Sig Dispense Refill    allopurinol (ZYLOPRIM) 100 MG tablet Take 1 tablet (100 mg) by mouth daily. 90 tablet 3    amLODIPine (NORVASC) 10 MG tablet Take 1 tablet (10 mg) by mouth daily. 90 tablet 3    atorvastatin (LIPITOR) 20 MG tablet Take 1 tablet (20 mg) by mouth daily. - start 6/16/25 90 tablet 0    carvedilol (COREG) 25 MG tablet Take 1 tablet (25 mg) by mouth 2 times daily (with meals). 180 tablet 3    losartan (COZAAR) 100 MG tablet Take 1 tablet (100 mg) by mouth daily. 90 tablet 3    nortriptyline (PAMELOR) 10 MG capsule Take 1 capsule (10 mg) by mouth at bedtime. - may increase to 2 caps after 4-5 days if needed 60 capsule 1    olopatadine (PATANOL) 0.1 % ophthalmic solution Place 1 drop into both eyes 2 times daily 5 mL 5       No Known Allergies     Social History     Tobacco Use    Smoking status: Never     Passive exposure: Never    Smokeless tobacco: Never   Substance Use Topics    Alcohol use: Yes     Comment: Occasional, less than 1 drink per month       History   Drug Use No             Review of  "Systems  Constitutional, neuro, ENT, endocrine, pulmonary, cardiac, gastrointestinal, genitourinary, musculoskeletal, integument and psychiatric systems are negative, except as otherwise noted.    Objective    /60   Pulse 76   Temp 98.5  F (36.9  C) (Temporal)   Resp 20   Ht 1.753 m (5' 9.02\")   Wt 82.9 kg (182 lb 12.8 oz)   SpO2 98%   BMI 26.98 kg/m     Estimated body mass index is 26.98 kg/m  as calculated from the following:    Height as of this encounter: 1.753 m (5' 9.02\").    Weight as of this encounter: 82.9 kg (182 lb 12.8 oz).  Physical Exam  GENERAL: alert and no distress  EYES: Eyes grossly normal to inspection  HENT: ear canals and TM's normal, nose and mouth without ulcers or lesions  NECK: no adenopathy, no asymmetry, masses, or scars  RESP: lungs clear to auscultation - no rales, rhonchi or wheezes  CV: regular rates and rhythm, normal S1 S2, no S3 or S4, and no murmur, click or rub  ABDOMEN: soft, nontender, without hepatosplenomegaly or masses  MS: no gross musculoskeletal defects noted, no edema  SKIN: no suspicious lesions or rashes  NEURO: Normal strength and tone, mentation intact and speech normal  PSYCH: mentation appears normal, affect normal/bright    Recent Labs   Lab Test 06/09/25  1451 12/09/24  1124   HGB  --  9.3*    137   POTASSIUM 4.9 4.9   CR 2.08* 1.78*        Diagnostics  Recent Results (from the past 48 hours)   Basic metabolic panel  (Ca, Cl, CO2, Creat, Gluc, K, Na, BUN)    Collection Time: 07/09/25 10:29 AM   Result Value Ref Range    Sodium 137 135 - 145 mmol/L    Potassium 4.7 3.4 - 5.3 mmol/L    Chloride 105 98 - 107 mmol/L    Carbon Dioxide (CO2) 20 (L) 22 - 29 mmol/L    Anion Gap 12 7 - 15 mmol/L    Urea Nitrogen 45.8 (H) 8.0 - 23.0 mg/dL    Creatinine 2.26 (H) 0.67 - 1.17 mg/dL    GFR Estimate 32 (L) >60 mL/min/1.73m2    Calcium 9.7 8.8 - 10.4 mg/dL    Glucose 140 (H) 70 - 99 mg/dL   CBC with platelets    Collection Time: 07/09/25 10:29 AM   Result " Value Ref Range    WBC Count 6.2 4.0 - 11.0 10e3/uL    RBC Count 3.49 (L) 4.40 - 5.90 10e6/uL    Hemoglobin 11.0 (L) 13.3 - 17.7 g/dL    Hematocrit 32.6 (L) 40.0 - 53.0 %    MCV 93 78 - 100 fL    MCH 31.5 26.5 - 33.0 pg    MCHC 33.7 31.5 - 36.5 g/dL    RDW 12.2 10.0 - 15.0 %    Platelet Count 251 150 - 450 10e3/uL      No EKG required, no history of coronary heart disease, significant arrhythmia, peripheral arterial disease or other structural heart disease.    Revised Cardiac Risk Index (RCRI)  The patient has the following serious cardiovascular risks for perioperative complications:   - No serious cardiac risks = 0 points     RCRI Interpretation: 0 points: Class I (very low risk - 0.4% complication rate)         Signed Electronically by: Jayla Trevino PA-C  A copy of this evaluation report is provided to the requesting physician.

## 2025-07-10 ENCOUNTER — RESULTS FOLLOW-UP (OUTPATIENT)
Dept: FAMILY MEDICINE | Facility: CLINIC | Age: 65
End: 2025-07-10
Payer: COMMERCIAL

## 2025-07-10 PROBLEM — N18.31 STAGE 3A CHRONIC KIDNEY DISEASE (H): Status: RESOLVED | Noted: 2025-07-09 | Resolved: 2025-07-10

## 2025-07-10 PROBLEM — N18.32 STAGE 3B CHRONIC KIDNEY DISEASE (H): Status: ACTIVE | Noted: 2025-07-09

## 2025-07-10 PROBLEM — N18.32 STAGE 3B CHRONIC KIDNEY DISEASE (H): Status: RESOLVED | Noted: 2025-07-09 | Resolved: 2025-07-10

## 2025-07-10 LAB
ANION GAP SERPL CALCULATED.3IONS-SCNC: 12 MMOL/L (ref 7–15)
BUN SERPL-MCNC: 45.8 MG/DL (ref 8–23)
CALCIUM SERPL-MCNC: 9.7 MG/DL (ref 8.8–10.4)
CHLORIDE SERPL-SCNC: 105 MMOL/L (ref 98–107)
CREAT SERPL-MCNC: 2.26 MG/DL (ref 0.67–1.17)
EGFRCR SERPLBLD CKD-EPI 2021: 32 ML/MIN/1.73M2
GLUCOSE SERPL-MCNC: 140 MG/DL (ref 70–99)
HCO3 SERPL-SCNC: 20 MMOL/L (ref 22–29)
POTASSIUM SERPL-SCNC: 4.7 MMOL/L (ref 3.4–5.3)
SODIUM SERPL-SCNC: 137 MMOL/L (ref 135–145)

## 2025-07-10 NOTE — RESULT ENCOUNTER NOTE
Left message on voicemail advising patient to return call at [phone].    Melita LARIOSN, RN  [facility], [place]

## 2025-07-10 NOTE — TELEPHONE ENCOUNTER
Please call patient with the following info:    Kidney function is moderately decreased still, similar to 1 month ago. Is he taking an NSAID? If so, how much and how often?    Also, he may need to check with his surgeon, but if he's going under general anesthesia we recommend holding losartan 12 hours before. If not general, than no need to hold

## 2025-07-14 NOTE — RESULT ENCOUNTER NOTE
Left message on voicemail advising patient to return call at [phone].    [place] LARIOSN, RN  [facility], [place]

## 2025-07-14 NOTE — TELEPHONE ENCOUNTER
Pt called back and informed the below info. Pt said he will call his surgeon. Pt said that he is taking Nortriptyline 1 in the morning and I in the afternoon.    Gina DIEZ CMA

## 2025-07-18 ENCOUNTER — TELEPHONE (OUTPATIENT)
Dept: NEPHROLOGY | Facility: CLINIC | Age: 65
End: 2025-07-18
Payer: COMMERCIAL

## 2025-07-18 NOTE — TELEPHONE ENCOUNTER
This encounter is being sent to inform the clinic that this patient has a referral from Brianna Velasquez PA-C in [facility] for the diagnoses of Stage 3b chronic kidney disease (H); Chronic Kidney Disease and has requested that this patient be seen within Urgent: 3-5 Days and/or with Urgent: 3-5 Days. Based on the availability of our provider(s), we are unable to accommodate this request.    Were all sites offered this patient?  Yes  1st choice [facility]  2nd choice [place]  Does scheduling algorithm request to schedule next available?  Patient appointment has not been scheduled.  Please review the referral request for accommodation and contact the patient.  If unable to accommodate, please resubmit a referral and indicate a preferred partner or affiliate location using Provider Finder or Scheduling Instructions field.       Urgent Referral Order - please reach out to Pt to schedule    Thank you!

## 2025-07-23 NOTE — TELEPHONE ENCOUNTER
PCP asking for pt to be referred to preferred partners- [facility].     [facility]  [address]  [address]  [place] [address]    Phone: [phone]  Fax: [phone]    Clinic to fax referral to [facility]ATT[facility]

## 2025-07-23 NOTE — TELEPHONE ENCOUNTER
"Referral reviewed. There is not availability at [place] or  within requested time frame. Offered pt appt today at [facility] at 2 pm. He wishes not to travel to \"the [facility]\" as he is not familiar with these areas. Declined video visit- no smart phone or computer. Discussed concern with availability at our outreach sites ([place], , [place]) and concern that the availability may not meet his referral timeframe. Pt notes that he is looking to have a hip surgery, was \"finally\" approved for it and now the nephrology consult is pending his potential surgery scheduling.     Next potential opening for a new consult is October. Will message patient's referring provider asking if they wish to refer to preferred partner, [facility].   "

## 2025-07-23 NOTE — TELEPHONE ENCOUNTER
Referral faxed to [facility] fax number [phone] per request.     MARIIA Vargas   Neph/Pulm [facility]

## 2025-08-03 DIAGNOSIS — G89.29 CHRONIC PAIN OF BOTH HIPS: ICD-10-CM

## 2025-08-03 DIAGNOSIS — M25.552 CHRONIC PAIN OF BOTH HIPS: ICD-10-CM

## 2025-08-03 DIAGNOSIS — M25.551 CHRONIC PAIN OF BOTH HIPS: ICD-10-CM

## 2025-08-04 RX ORDER — NORTRIPTYLINE HYDROCHLORIDE 10 MG/1
10 CAPSULE ORAL AT BEDTIME
Qty: 180 CAPSULE | Refills: 0 | Status: SHIPPED | OUTPATIENT
Start: 2025-08-04

## 2025-08-18 ENCOUNTER — LAB (OUTPATIENT)
Dept: LAB | Facility: CLINIC | Age: 65
End: 2025-08-18
Payer: COMMERCIAL

## 2025-08-18 DIAGNOSIS — E78.5 HYPERLIPIDEMIA LDL GOAL <130: ICD-10-CM

## 2025-08-18 LAB
ALBUMIN SERPL BCG-MCNC: 4.5 G/DL (ref 3.5–5.2)
ALP SERPL-CCNC: 121 U/L (ref 40–150)
ALT SERPL W P-5'-P-CCNC: 20 U/L (ref 0–70)
AST SERPL W P-5'-P-CCNC: 24 U/L (ref 0–45)
BILIRUB SERPL-MCNC: 0.5 MG/DL
BILIRUBIN DIRECT (ROCHE PRO & PURE): 0.21 MG/DL (ref 0–0.45)
CHOLEST SERPL-MCNC: 115 MG/DL
FASTING STATUS PATIENT QL REPORTED: NO
HDLC SERPL-MCNC: 37 MG/DL
LDLC SERPL CALC-MCNC: 59 MG/DL
NONHDLC SERPL-MCNC: 78 MG/DL
PROT SERPL-MCNC: 7.6 G/DL (ref 6.4–8.3)
TRIGL SERPL-MCNC: 97 MG/DL

## 2025-08-18 PROCEDURE — 36415 COLL VENOUS BLD VENIPUNCTURE: CPT

## 2025-08-18 PROCEDURE — 80061 LIPID PANEL: CPT

## 2025-08-18 PROCEDURE — 80076 HEPATIC FUNCTION PANEL: CPT

## 2025-08-19 ENCOUNTER — RESULTS FOLLOW-UP (OUTPATIENT)
Dept: FAMILY MEDICINE | Facility: CLINIC | Age: 65
End: 2025-08-19
Payer: COMMERCIAL